# Patient Record
Sex: MALE | Race: WHITE | Employment: FULL TIME | URBAN - METROPOLITAN AREA
[De-identification: names, ages, dates, MRNs, and addresses within clinical notes are randomized per-mention and may not be internally consistent; named-entity substitution may affect disease eponyms.]

---

## 2017-05-06 ENCOUNTER — ANESTHESIA EVENT (OUTPATIENT)
Dept: SURGERY | Facility: CLINIC | Age: 57
End: 2017-05-06
Payer: COMMERCIAL

## 2017-05-06 ENCOUNTER — HOSPITAL ENCOUNTER (OUTPATIENT)
Facility: CLINIC | Age: 57
Setting detail: OBSERVATION
Discharge: HOME | End: 2017-05-07
Attending: EMERGENCY MEDICINE | Admitting: COLON & RECTAL SURGERY
Payer: COMMERCIAL

## 2017-05-06 ENCOUNTER — ANESTHESIA (OUTPATIENT)
Dept: SURGERY | Facility: CLINIC | Age: 57
End: 2017-05-06
Payer: COMMERCIAL

## 2017-05-06 DIAGNOSIS — K62.89 ANAL PAIN: Primary | ICD-10-CM

## 2017-05-06 DIAGNOSIS — K64.9 BLEEDING HEMORRHOID: ICD-10-CM

## 2017-05-06 LAB
BASOPHILS # BLD AUTO: 0 10E9/L (ref 0–0.2)
BASOPHILS NFR BLD AUTO: 0.4 %
DIFFERENTIAL METHOD BLD: ABNORMAL
EOSINOPHIL # BLD AUTO: 0.3 10E9/L (ref 0–0.7)
EOSINOPHIL NFR BLD AUTO: 4.4 %
ERYTHROCYTE [DISTWIDTH] IN BLOOD BY AUTOMATED COUNT: 19.9 % (ref 10–15)
HCT VFR BLD AUTO: 32.9 % (ref 40–53)
HGB BLD-MCNC: 9.9 G/DL (ref 13.3–17.7)
IMM GRANULOCYTES # BLD: 0 10E9/L (ref 0–0.4)
IMM GRANULOCYTES NFR BLD: 0.1 %
LYMPHOCYTES # BLD AUTO: 1.1 10E9/L (ref 0.8–5.3)
LYMPHOCYTES NFR BLD AUTO: 16.1 %
MCH RBC QN AUTO: 20 PG (ref 26.5–33)
MCHC RBC AUTO-ENTMCNC: 30.1 G/DL (ref 31.5–36.5)
MCV RBC AUTO: 67 FL (ref 78–100)
MONOCYTES # BLD AUTO: 0.6 10E9/L (ref 0–1.3)
MONOCYTES NFR BLD AUTO: 7.9 %
NEUTROPHILS # BLD AUTO: 5.1 10E9/L (ref 1.6–8.3)
NEUTROPHILS NFR BLD AUTO: 71.1 %
NRBC # BLD AUTO: 0 10*3/UL
NRBC BLD AUTO-RTO: 0 /100
PLATELET # BLD AUTO: 306 10E9/L (ref 150–450)
RBC # BLD AUTO: 4.94 10E12/L (ref 4.4–5.9)
WBC # BLD AUTO: 7.1 10E9/L (ref 4–11)

## 2017-05-06 PROCEDURE — 37000008 ZZH ANESTHESIA TECHNICAL FEE, 1ST 30 MIN: Performed by: COLON & RECTAL SURGERY

## 2017-05-06 PROCEDURE — 25000125 ZZHC RX 250: Performed by: NURSE ANESTHETIST, CERTIFIED REGISTERED

## 2017-05-06 PROCEDURE — 25000125 ZZHC RX 250: Performed by: ANESTHESIOLOGY

## 2017-05-06 PROCEDURE — G0378 HOSPITAL OBSERVATION PER HR: HCPCS

## 2017-05-06 PROCEDURE — 25800025 ZZH RX 258: Performed by: NURSE ANESTHETIST, CERTIFIED REGISTERED

## 2017-05-06 PROCEDURE — 27210995 ZZH RX 272: Performed by: COLON & RECTAL SURGERY

## 2017-05-06 PROCEDURE — 36000052 ZZH SURGERY LEVEL 2 EA 15 ADDTL MIN: Performed by: COLON & RECTAL SURGERY

## 2017-05-06 PROCEDURE — 71000012 ZZH RECOVERY PHASE 1 LEVEL 1 FIRST HR: Performed by: COLON & RECTAL SURGERY

## 2017-05-06 PROCEDURE — 25000128 H RX IP 250 OP 636: Performed by: COLON & RECTAL SURGERY

## 2017-05-06 PROCEDURE — 37000009 ZZH ANESTHESIA TECHNICAL FEE, EACH ADDTL 15 MIN: Performed by: COLON & RECTAL SURGERY

## 2017-05-06 PROCEDURE — 96374 THER/PROPH/DIAG INJ IV PUSH: CPT

## 2017-05-06 PROCEDURE — 25000128 H RX IP 250 OP 636: Performed by: NURSE ANESTHETIST, CERTIFIED REGISTERED

## 2017-05-06 PROCEDURE — 85025 COMPLETE CBC W/AUTO DIFF WBC: CPT | Performed by: COLON & RECTAL SURGERY

## 2017-05-06 PROCEDURE — 40000169 ZZH STATISTIC PRE-PROCEDURE ASSESSMENT I: Performed by: COLON & RECTAL SURGERY

## 2017-05-06 PROCEDURE — 25000128 H RX IP 250 OP 636: Performed by: ANESTHESIOLOGY

## 2017-05-06 PROCEDURE — 25000125 ZZHC RX 250: Performed by: COLON & RECTAL SURGERY

## 2017-05-06 PROCEDURE — 27210794 ZZH OR GENERAL SUPPLY STERILE: Performed by: COLON & RECTAL SURGERY

## 2017-05-06 PROCEDURE — 88304 TISSUE EXAM BY PATHOLOGIST: CPT | Mod: 26 | Performed by: COLON & RECTAL SURGERY

## 2017-05-06 PROCEDURE — 99285 EMERGENCY DEPT VISIT HI MDM: CPT | Mod: 25

## 2017-05-06 PROCEDURE — S0020 INJECTION, BUPIVICAINE HYDRO: HCPCS | Performed by: COLON & RECTAL SURGERY

## 2017-05-06 PROCEDURE — 88304 TISSUE EXAM BY PATHOLOGIST: CPT | Performed by: COLON & RECTAL SURGERY

## 2017-05-06 PROCEDURE — 36000050 ZZH SURGERY LEVEL 2 1ST 30 MIN: Performed by: COLON & RECTAL SURGERY

## 2017-05-06 RX ORDER — SODIUM CHLORIDE, SODIUM LACTATE, POTASSIUM CHLORIDE, CALCIUM CHLORIDE 600; 310; 30; 20 MG/100ML; MG/100ML; MG/100ML; MG/100ML
INJECTION, SOLUTION INTRAVENOUS CONTINUOUS PRN
Status: DISCONTINUED | OUTPATIENT
Start: 2017-05-06 | End: 2017-05-06

## 2017-05-06 RX ORDER — LORATADINE 10 MG/1
10 TABLET ORAL DAILY
Status: ON HOLD | COMMUNITY
End: 2017-05-06

## 2017-05-06 RX ORDER — MAGNESIUM HYDROXIDE 1200 MG/15ML
LIQUID ORAL PRN
Status: DISCONTINUED | OUTPATIENT
Start: 2017-05-06 | End: 2017-05-06 | Stop reason: HOSPADM

## 2017-05-06 RX ORDER — ONDANSETRON 4 MG/1
4 TABLET, ORALLY DISINTEGRATING ORAL EVERY 6 HOURS PRN
Status: DISCONTINUED | OUTPATIENT
Start: 2017-05-06 | End: 2017-05-07 | Stop reason: HOSPADM

## 2017-05-06 RX ORDER — ONDANSETRON 2 MG/ML
INJECTION INTRAMUSCULAR; INTRAVENOUS PRN
Status: DISCONTINUED | OUTPATIENT
Start: 2017-05-06 | End: 2017-05-06

## 2017-05-06 RX ORDER — KETOROLAC TROMETHAMINE 30 MG/ML
INJECTION, SOLUTION INTRAMUSCULAR; INTRAVENOUS PRN
Status: DISCONTINUED | OUTPATIENT
Start: 2017-05-06 | End: 2017-05-06

## 2017-05-06 RX ORDER — LIDOCAINE 40 MG/G
CREAM TOPICAL
Status: DISCONTINUED | OUTPATIENT
Start: 2017-05-06 | End: 2017-05-07 | Stop reason: HOSPADM

## 2017-05-06 RX ORDER — ONDANSETRON 4 MG/1
4 TABLET, ORALLY DISINTEGRATING ORAL EVERY 30 MIN PRN
Status: DISCONTINUED | OUTPATIENT
Start: 2017-05-06 | End: 2017-05-06 | Stop reason: HOSPADM

## 2017-05-06 RX ORDER — PROPOFOL 10 MG/ML
INJECTION, EMULSION INTRAVENOUS PRN
Status: DISCONTINUED | OUTPATIENT
Start: 2017-05-06 | End: 2017-05-06

## 2017-05-06 RX ORDER — NALOXONE HYDROCHLORIDE 0.4 MG/ML
.1-.4 INJECTION, SOLUTION INTRAMUSCULAR; INTRAVENOUS; SUBCUTANEOUS
Status: DISCONTINUED | OUTPATIENT
Start: 2017-05-06 | End: 2017-05-07 | Stop reason: HOSPADM

## 2017-05-06 RX ORDER — ONDANSETRON 2 MG/ML
4 INJECTION INTRAMUSCULAR; INTRAVENOUS EVERY 30 MIN PRN
Status: DISCONTINUED | OUTPATIENT
Start: 2017-05-06 | End: 2017-05-06 | Stop reason: HOSPADM

## 2017-05-06 RX ORDER — FENTANYL CITRATE 50 UG/ML
50 INJECTION, SOLUTION INTRAMUSCULAR; INTRAVENOUS ONCE
Status: COMPLETED | OUTPATIENT
Start: 2017-05-06 | End: 2017-05-06

## 2017-05-06 RX ORDER — FENTANYL CITRATE 0.05 MG/ML
25-50 INJECTION, SOLUTION INTRAMUSCULAR; INTRAVENOUS
Status: DISCONTINUED | OUTPATIENT
Start: 2017-05-06 | End: 2017-05-06 | Stop reason: HOSPADM

## 2017-05-06 RX ORDER — OXYCODONE AND ACETAMINOPHEN 5; 325 MG/1; MG/1
1-2 TABLET ORAL EVERY 6 HOURS PRN
Qty: 50 TABLET | Refills: 0 | Status: ON HOLD | OUTPATIENT
Start: 2017-05-06 | End: 2017-05-12

## 2017-05-06 RX ORDER — ONDANSETRON 2 MG/ML
4 INJECTION INTRAMUSCULAR; INTRAVENOUS EVERY 6 HOURS PRN
Status: DISCONTINUED | OUTPATIENT
Start: 2017-05-06 | End: 2017-05-07 | Stop reason: HOSPADM

## 2017-05-06 RX ORDER — DIAZEPAM 5 MG
5 TABLET ORAL EVERY 8 HOURS PRN
Qty: 20 TABLET | Refills: 0 | Status: SHIPPED | OUTPATIENT
Start: 2017-05-06

## 2017-05-06 RX ORDER — OXYCODONE AND ACETAMINOPHEN 5; 325 MG/1; MG/1
1-2 TABLET ORAL EVERY 6 HOURS PRN
Status: DISCONTINUED | OUTPATIENT
Start: 2017-05-06 | End: 2017-05-07 | Stop reason: HOSPADM

## 2017-05-06 RX ORDER — IBUPROFEN 600 MG/1
600 TABLET, FILM COATED ORAL
Status: DISCONTINUED | OUTPATIENT
Start: 2017-05-06 | End: 2017-05-06

## 2017-05-06 RX ORDER — CETIRIZINE HYDROCHLORIDE 10 MG/1
TABLET ORAL DAILY
COMMUNITY

## 2017-05-06 RX ORDER — IBUPROFEN 600 MG/1
600 TABLET, FILM COATED ORAL EVERY 6 HOURS PRN
Status: DISCONTINUED | OUTPATIENT
Start: 2017-05-06 | End: 2017-05-07 | Stop reason: HOSPADM

## 2017-05-06 RX ORDER — DESVENLAFAXINE 50 MG/1
50 TABLET, FILM COATED, EXTENDED RELEASE ORAL DAILY
COMMUNITY

## 2017-05-06 RX ORDER — PROPOFOL 10 MG/ML
INJECTION, EMULSION INTRAVENOUS CONTINUOUS PRN
Status: DISCONTINUED | OUTPATIENT
Start: 2017-05-06 | End: 2017-05-06

## 2017-05-06 RX ORDER — MEPERIDINE HYDROCHLORIDE 25 MG/ML
12.5 INJECTION INTRAMUSCULAR; INTRAVENOUS; SUBCUTANEOUS ONCE
Status: COMPLETED | OUTPATIENT
Start: 2017-05-06 | End: 2017-05-06

## 2017-05-06 RX ORDER — ALBUTEROL SULFATE 90 UG/1
1-2 AEROSOL, METERED RESPIRATORY (INHALATION) EVERY 4 HOURS PRN
COMMUNITY

## 2017-05-06 RX ORDER — ACETAMINOPHEN 10 MG/ML
1000 INJECTION, SOLUTION INTRAVENOUS ONCE
Status: COMPLETED | OUTPATIENT
Start: 2017-05-06 | End: 2017-05-06

## 2017-05-06 RX ORDER — SODIUM CHLORIDE, SODIUM LACTATE, POTASSIUM CHLORIDE, CALCIUM CHLORIDE 600; 310; 30; 20 MG/100ML; MG/100ML; MG/100ML; MG/100ML
INJECTION, SOLUTION INTRAVENOUS CONTINUOUS
Status: DISCONTINUED | OUTPATIENT
Start: 2017-05-06 | End: 2017-05-06 | Stop reason: HOSPADM

## 2017-05-06 RX ORDER — DEXAMETHASONE SODIUM PHOSPHATE 4 MG/ML
INJECTION, SOLUTION INTRA-ARTICULAR; INTRALESIONAL; INTRAMUSCULAR; INTRAVENOUS; SOFT TISSUE PRN
Status: DISCONTINUED | OUTPATIENT
Start: 2017-05-06 | End: 2017-05-06

## 2017-05-06 RX ORDER — OXYCODONE AND ACETAMINOPHEN 5; 325 MG/1; MG/1
1-2 TABLET ORAL
Status: DISCONTINUED | OUTPATIENT
Start: 2017-05-06 | End: 2017-05-06

## 2017-05-06 RX ORDER — LIDOCAINE HYDROCHLORIDE 20 MG/ML
INJECTION, SOLUTION INFILTRATION; PERINEURAL PRN
Status: DISCONTINUED | OUTPATIENT
Start: 2017-05-06 | End: 2017-05-06

## 2017-05-06 RX ORDER — FENTANYL CITRATE 50 UG/ML
25-50 INJECTION, SOLUTION INTRAMUSCULAR; INTRAVENOUS
Status: DISCONTINUED | OUTPATIENT
Start: 2017-05-06 | End: 2017-05-06 | Stop reason: HOSPADM

## 2017-05-06 RX ORDER — HYDROMORPHONE HYDROCHLORIDE 1 MG/ML
.3-.5 INJECTION, SOLUTION INTRAMUSCULAR; INTRAVENOUS; SUBCUTANEOUS
Status: DISCONTINUED | OUTPATIENT
Start: 2017-05-06 | End: 2017-05-07 | Stop reason: HOSPADM

## 2017-05-06 RX ADMIN — FENTANYL CITRATE 25 MCG: 50 INJECTION, SOLUTION INTRAMUSCULAR; INTRAVENOUS at 18:16

## 2017-05-06 RX ADMIN — ROCURONIUM BROMIDE 10 MG: 10 INJECTION INTRAVENOUS at 17:45

## 2017-05-06 RX ADMIN — HYDROMORPHONE HYDROCHLORIDE 0.5 MG: 1 INJECTION, SOLUTION INTRAMUSCULAR; INTRAVENOUS; SUBCUTANEOUS at 15:01

## 2017-05-06 RX ADMIN — FENTANYL CITRATE 50 MCG: 50 INJECTION INTRAMUSCULAR; INTRAVENOUS at 19:18

## 2017-05-06 RX ADMIN — FENTANYL CITRATE 50 MCG: 50 INJECTION INTRAMUSCULAR; INTRAVENOUS at 19:00

## 2017-05-06 RX ADMIN — FENTANYL CITRATE 50 MCG: 50 INJECTION, SOLUTION INTRAMUSCULAR; INTRAVENOUS at 17:56

## 2017-05-06 RX ADMIN — SODIUM CHLORIDE, POTASSIUM CHLORIDE, SODIUM LACTATE AND CALCIUM CHLORIDE: 600; 310; 30; 20 INJECTION, SOLUTION INTRAVENOUS at 18:44

## 2017-05-06 RX ADMIN — HYDROMORPHONE HYDROCHLORIDE 0.5 MG: 1 INJECTION, SOLUTION INTRAMUSCULAR; INTRAVENOUS; SUBCUTANEOUS at 19:36

## 2017-05-06 RX ADMIN — HYDROMORPHONE HYDROCHLORIDE 0.5 MG: 1 INJECTION, SOLUTION INTRAMUSCULAR; INTRAVENOUS; SUBCUTANEOUS at 19:18

## 2017-05-06 RX ADMIN — HYDROMORPHONE HYDROCHLORIDE 0.5 MG: 1 INJECTION, SOLUTION INTRAMUSCULAR; INTRAVENOUS; SUBCUTANEOUS at 20:13

## 2017-05-06 RX ADMIN — PROPOFOL 200 MG: 10 INJECTION, EMULSION INTRAVENOUS at 17:45

## 2017-05-06 RX ADMIN — HYDROMORPHONE HYDROCHLORIDE 0.5 MG: 1 INJECTION, SOLUTION INTRAMUSCULAR; INTRAVENOUS; SUBCUTANEOUS at 23:13

## 2017-05-06 RX ADMIN — FENTANYL CITRATE 50 MCG: 50 INJECTION INTRAMUSCULAR; INTRAVENOUS at 19:25

## 2017-05-06 RX ADMIN — SODIUM CHLORIDE, POTASSIUM CHLORIDE, SODIUM LACTATE AND CALCIUM CHLORIDE: 600; 310; 30; 20 INJECTION, SOLUTION INTRAVENOUS at 17:08

## 2017-05-06 RX ADMIN — PROPOFOL 200 MCG/KG/MIN: 10 INJECTION, EMULSION INTRAVENOUS at 17:46

## 2017-05-06 RX ADMIN — MEPERIDINE HYDROCHLORIDE 12.5 MG: 25 INJECTION, SOLUTION INTRAMUSCULAR; INTRAVENOUS; SUBCUTANEOUS at 19:13

## 2017-05-06 RX ADMIN — SUCCINYLCHOLINE CHLORIDE 200 MG: 20 INJECTION, SOLUTION INTRAMUSCULAR; INTRAVENOUS at 17:45

## 2017-05-06 RX ADMIN — FENTANYL CITRATE 50 MCG: 50 INJECTION INTRAMUSCULAR; INTRAVENOUS at 19:07

## 2017-05-06 RX ADMIN — ACETAMINOPHEN 1000 MG: 10 INJECTION, SOLUTION INTRAVENOUS at 19:24

## 2017-05-06 RX ADMIN — HYDROMORPHONE HYDROCHLORIDE 0.5 MG: 1 INJECTION, SOLUTION INTRAMUSCULAR; INTRAVENOUS; SUBCUTANEOUS at 19:02

## 2017-05-06 RX ADMIN — ONDANSETRON 4 MG: 2 INJECTION INTRAMUSCULAR; INTRAVENOUS at 17:59

## 2017-05-06 RX ADMIN — ONDANSETRON 4 MG: 2 SOLUTION INTRAMUSCULAR; INTRAVENOUS at 20:22

## 2017-05-06 RX ADMIN — FENTANYL CITRATE 50 MCG: 50 INJECTION, SOLUTION INTRAMUSCULAR; INTRAVENOUS at 16:46

## 2017-05-06 RX ADMIN — FENTANYL CITRATE 50 MCG: 50 INJECTION, SOLUTION INTRAMUSCULAR; INTRAVENOUS at 17:42

## 2017-05-06 RX ADMIN — FENTANYL CITRATE 25 MCG: 50 INJECTION, SOLUTION INTRAMUSCULAR; INTRAVENOUS at 18:22

## 2017-05-06 RX ADMIN — KETOROLAC TROMETHAMINE 30 MG: 30 INJECTION, SOLUTION INTRAMUSCULAR at 18:33

## 2017-05-06 RX ADMIN — PROPOFOL 40 MG: 10 INJECTION, EMULSION INTRAVENOUS at 18:30

## 2017-05-06 RX ADMIN — MIDAZOLAM HYDROCHLORIDE 2 MG: 1 INJECTION, SOLUTION INTRAMUSCULAR; INTRAVENOUS at 17:39

## 2017-05-06 RX ADMIN — DEXAMETHASONE SODIUM PHOSPHATE 4 MG: 4 INJECTION, SOLUTION INTRA-ARTICULAR; INTRALESIONAL; INTRAMUSCULAR; INTRAVENOUS; SOFT TISSUE at 17:52

## 2017-05-06 RX ADMIN — HYDROMORPHONE HYDROCHLORIDE 0.5 MG: 1 INJECTION, SOLUTION INTRAMUSCULAR; INTRAVENOUS; SUBCUTANEOUS at 19:41

## 2017-05-06 RX ADMIN — LIDOCAINE HYDROCHLORIDE 60 MG: 20 INJECTION, SOLUTION INFILTRATION; PERINEURAL at 17:45

## 2017-05-06 ASSESSMENT — ENCOUNTER SYMPTOMS
CHILLS: 0
SHORTNESS OF BREATH: 0
RECTAL PAIN: 1
ANAL BLEEDING: 1
ABDOMINAL PAIN: 0
FEVER: 0
CONSTIPATION: 0

## 2017-05-06 ASSESSMENT — PAIN DESCRIPTION - DESCRIPTORS: DESCRIPTORS: DISCOMFORT

## 2017-05-06 NOTE — IP AVS SNAPSHOT
[facility] Observation Unit    [address] [place] [id number]    Phone:  [phone]                                       After Visit Summary   5/6/2017    Aman Tarango    MRN: [id number]           After Visit Summary Signature Page     I have received my discharge instructions, and my questions have been answered. I have discussed any challenges I see with this plan with the nurse or doctor.    ..........................................................................................................................................  Patient/Patient Representative Signature      ..........................................................................................................................................  Patient Representative Print Name and Relationship to Patient    ..................................................               ................................................  Date                                            Time    ..........................................................................................................................................  Reviewed by Signature/Title    ...................................................              ..............................................  Date                                                            Time

## 2017-05-06 NOTE — DISCHARGE INSTRUCTIONS
**If you have questions or concerns about your procedure,  call Dr. Ball at [phone]**  Discharge Instructions Following Anorectal Surgery  [facility]  [phone]  Medication:     You may be prescribed a narcotic pain relievers such as: Percocet and Valium.  You should reduce your use of these medications as your pain improves.  You may be prescribed an anal ointment (such as Americain, Tronothane, or Xylocaine). Apply the ointment to the anal area with your finger, then cover the area with cotton or gauze.  Bulk forming stool softeners (Konsyl-DR, Metamucil, or Citrucel) are to be taken in a glass of water once in the morning with increased water intake throughout the day.   Bowel function:   Bowel movements can be painful. It is important to have regular bowel movements at least every other day and to keep your stool soft. Constipation and/or diarrhea can make the pain worse and must be avoided.   Constipation:  You should have a bowel movement at least every other day.  If two days pass without one, take one tablespoon of milk of magnesia; if there is no result, repeat this dose in six hours. You may also use an over-the-counter phosphate enema or tap water enema.   Diarrhea:  Diarrhea, usually caused by the overuse of laxatives, is also a concern. If you have more than three watery bowel movements during a 24 hour period, stop taking milk of magnesia or laxatives but continue taking the bulk forming agents.  If diarrhea persists, call your doctor.   Bathing:  After bowel movements, use a wet washcloth, toilet paper or cotton to clean yourself.  If possible take a sitz bath or tub bath immediately. Baths should last between 10-15 minutes with the water as warm as you can comfortably tolerate. Try to take at least three sitz baths (or showers with hand-held sprayer) every day.   Discharge/Infection:  Bloody discharge after bowel movements is normal and may last 2 to 4 weeks after  your surgery. However, if you have bleeding between bowel movements that doesn't stop, call the office.  Urination:  If you have trouble urinating, do so while sitting in a tub of water, or run the water faucet while sitting on the toilet. If you are unable to urinate 6-8 hours after surgery, call the office.  Diet:  A high fiber diet, including at least four servings of fruits or vegetables daily, will help to keep your bowel movements regular and soft. It is important to drink six to eight glasses of water or juice everyday when using fiber products.   Activity:    Activity as tolerated. After some surgeries, you may be told not to perform any lifting (more than 10 pounds) for several weeks after surgery.    Call the office if you have:  Fever greater than 101 F  Chills   Foul-smelling drainage   Nausea and vomiting   Diarrhea - greater than 3 water stools in 24 hours   Constipation - no bowel movement for 3 days   Severe bleeding that does not stop soon after a bowel movement   Problems with the incision, including increased pain, swelling, redness, or drainage.  Difficulty urinating                  Hemorrhoids    Hemorrhoids are swollen and inflamed veins inside the rectum and near the anus. The rectum is the last several inches of the colon. The anus is the passage between the rectum and the outside of the body.  Causes   The veins can become swollen due to increased pressure in them. This is most often caused by:    Chronic constipation or diarrhea    Straining when having a bowel movement    Sitting too long on the toilet    A low-fiber diet    Pregnancy  Symptoms     Bleeding from the rectum (this may be noticeable after bowel movements)    Lump near the anus    Itching around the anus    Pain around the anus  There are different types of hemorrhoids. Depending on the type you have and the severity, you may be able to treat yourself at home. In some cases, a procedure may be the best treatment option. Your  healthcare provider can tell you more about this, if needed.  Home care  General care    To get relief from pain or itching, try:    Topical products. Your healthcare provider may prescribe or recommend creams, ointments, or pads that can be applied to the hemorrhoid. Use these exactly as directed.    Medicines. Your healthcare provider may recommend stool softeners, suppositories, or laxatives to help manage constipation. Use these exactly as directed.    Sitz baths. A sitz bath involves sitting in a few inches of warm bath water. Be careful not to make the water so hot that you burn yourself--test it before sitting in it. Soak for about 10 to 15 minutes a few times a day. This may help relieve pain.  Tips to help prevent hemorrhoids    Eat more fiber. Fiber adds bulk to stool and absorbs water as it moves through your colon. This makes stool softer and easier to pass.    Increase the fiber in your diet with more fiber-rich foods. These include fresh fruit, vegetables, and whole grains.    Take a fiber supplement or bulking agent, if advised to by your provider. These include products such as psyllium or methylcellulose.    Drink plenty of water, if directed to by your provider. This can help keep stool soft.    Be more active. Frequent exercise aids digestion and helps prevent constipation. It may also help make bowel movements more regular.    Don t strain during bowel movements. This can make hemorrhoids more likely. Also, don t sit on the toilet for long periods of time.  Follow-up care  Follow up with your healthcare provider, or as advised. If a culture or imaging tests were done, you will be notified of the results when they are ready. This may take a few days or longer.  When to seek medical advice  Call your healthcare provider right away if any of these occur:    Increased bleeding from the rectum    Increased pain around the rectum or anus    Weakness or dizziness   Call 911   Call 911 or return to the  emergency department right away if any of these occur:    Trouble breathing or swallowing    Fainting or loss of consciousness    Unusually fast heart rate    Vomiting blood    Large amounts of blood in stool      6026-8092 [employer] [address] [place] [address], [place] [address]. All rights reserved. This information is not intended as a substitute for professional medical care. Always follow your healthcare professional's instructions.          Treating Hemorrhoids: Removal  If your symptoms persist, your doctor may recommend removing the hemorrhoid. This can be done in your doctor s office or at a surgical center. In most cases, no special preparation is needed. Keep in mind that your treatment may differ depending on your symptoms and the location of the hemorrhoid.  Internal Hemorrhoids  You ll be asked to lie or kneel on a table. Your doctor then inserts an anoscope to view the anal canal. To treat the hemorrhoid, your doctor will use one of the methods listed below. Because internal hemorrhoids do not have nerves that sense pain, you won t have too much discomfort. You can often return to your normal routine the same day. If you have many hemorrhoids, you may need repeated treatments.    Banding  The banding method is done by placing tight elastic bands around the base of the hemorrhoid. This cuts off blood supply to the hemorrhoid, causing it to fall off. This usually takes about a week. The area then heals within a few days.       Infrared Coagulation  This procedure is done using a small probe that exposes the hemorrhoid to short bursts of infrared light. This seals off the blood vessel, causing it to shrink. Slight bleeding may occur for a few days. The area usually heals within a week or two.       Sclerotherapy  Sclerotherapy is done by injecting a chemical into the tissue around the hemorrhoid. The chemical causes the hemorrhoid to shrink within a few days. Bleeding usually stops in about  24 hours.  Thrombosed External Hemorrhoids  Thrombosed external hemorrhoids are often very painful. That s because the swollen hemorrhoid stretches the sensitive skin around it. To relieve the pain, your doctor may remove the blood clot. This takes just a few minutes. You may need to rest for a few days before returning to work.    Numbing the Hemorrhoid: You ll be asked to lie or kneel on a table. The hemorrhoid is then injected with a local anesthetic. This may cause some discomfort for a moment. But within a short time your doctor will be able to remove the hemorrhoid without causing pain.    Removing the Hemorrhoid: A small incision is made to remove the blood clot. The hemorrhoid may also be removed. The skin is then either closed with sutures or left open to heal on its own. The area around the incision will likely be sore for a few days. But your pain should improve soon after the procedure.  Risks and Complications  The possible risks and complications include:    Infection    Bleeding    Trouble urinating (Doesn't occur with thrombosed external hemorrhoids)    Narrowing of the anal canal (very rare, doesn't occur with thrombosed external hemorrhoids)  When to Call Your Doctor  After your procedure, call your doctor if you have:    Increasing pain    Fever or chills    Persistent bleeding    Trouble urinating        6940-2411 [employer]. [address] [place] [address], [place] [address]. All rights reserved. This information is not intended as a substitute for professional medical care. Always follow your healthcare professional's instructions.

## 2017-05-06 NOTE — ED NOTES
[facility]  ED Nurse Handoff Report    ED Chief complaint: Hemorrhoids (pt has recent banding of hemorrhoid)      ED Diagnosis:   Final diagnoses:   Bleeding hemorrhoid       Code Status: Full Code    Allergies: No Known Allergies    Activity level - Baseline/Home:  Independent    Activity Level - Current:   Independent     Needed?: No    Isolation: No  Infection: Not Applicable    Bariatric?: No    Vital Signs:   Vitals:    05/06/17 1255 05/06/17 1345 05/06/17 1502 05/06/17 1503   BP: (!) 144/94 110/70 140/89    Pulse: 111 91     Resp: 16 16     Temp: 97.7  F (36.5  C)      TempSrc: Oral      SpO2: 99% 95%  100%   Weight: 90.7 kg (200 lb)          Cardiac Rhythm: ,        Pain level: 0-10 Pain Scale: 8    Is this patient confused?: No    Patient Report: Initial Complaint: Patient came in with hemorrhoid pain  Focused Assessment: Patient came in today after having recent banding surgery of hemorrhoids. He reports pain has been steadily increasing over past several days and today he can not tolerate it anymore. Surgical consult was done and patient will be taken to surgery later today.   Tests Performed: CBC  Abnormal Results: pending  Treatments provided: ice to rectal area, IV placed right AC, Hydromorphone 0.5mg given IV    Family Comments: SO at bedside    OBS brochure/video discussed/provided to patient: Yes    ED Medications:   Medications   lidocaine 1 % 1 mL (not administered)   lidocaine (LMX4) cream (not administered)   sodium chloride (PF) 0.9% PF flush 3 mL (not administered)   sodium chloride (PF) 0.9% PF flush 3 mL (not administered)   naloxone (NARCAN) injection 0.1-0.4 mg (not administered)   ondansetron (ZOFRAN-ODT) ODT tab 4 mg (not administered)     Or   ondansetron (ZOFRAN) injection 4 mg (not administered)   HYDROmorphone (PF) (DILAUDID) injection 0.3-0.5 mg (0.5 mg Intravenous Given 5/6/17 1501)       Drips infusing?:  No      ED NURSE PHONE NUMBER: *[phone]

## 2017-05-06 NOTE — ANESTHESIA PREPROCEDURE EVALUATION
Anesthesia Evaluation     . Pt has had prior anesthetic.     History of anesthetic complications   - PONV        ROS/MED HX    ENT/Pulmonary:     (+)Intermittent asthma Treatment: Inhaler prn,  , . .   (-) sleep apnea   Neurologic:       Cardiovascular:  - neg cardiovascular ROS       METS/Exercise Tolerance:     Hematologic:         Musculoskeletal:         GI/Hepatic:     (+) GERD Asymptomatic on medication,       Renal/Genitourinary:         Endo:     (+) thyroid problem hypothyroidism, .      Psychiatric:         Infectious Disease:         Malignancy:         Other:                     Physical Exam  Normal systems: cardiovascular and pulmonary    Airway   Mallampati: I  TM distance: >3 FB  Neck ROM: full    Dental   Comment: native    Cardiovascular       Pulmonary                     Anesthesia Plan      History & Physical Review  History and physical reviewed and following examination; no interval change.    ASA Status:  2 .    NPO Status:  > 6 hours    Plan for General and ETT with Propofol induction. Maintenance will be TIVA.    PONV prophylaxis:  Ondansetron (or other 5HT-3) and Dexamethasone or Solumedrol       Postoperative Care      Consents  Anesthetic plan, risks, benefits and alternatives discussed with:  Patient..                          .

## 2017-05-06 NOTE — IP AVS SNAPSHOT
MRN:[id number]                      After Visit Summary   5/6/2017    Aman Tarango    MRN: [id number]           Thank you!     Thank you for choosing [facility] for your care. Our goal is always to provide you with excellent care. Hearing back from our patients is one way we can continue to improve our services. Please take a few minutes to complete the written survey that you may receive in the mail after you visit with us. Thank you!        Patient Information     Date Of Birth          1960        About your hospital stay     You were admitted on:  May 6, 2017 [facility]    You were discharged on:  May 7, 2017        Reason for your hospital stay       Anal pain status post surgery for hemorrhoid removal                  Who to Call     For medical emergencies, please call 911.  For non-urgent questions about your medical care, please call your primary care provider or clinic, [phone]  For questions related to your surgery, please call your surgery clinic        Attending Provider     Provider Specialty    Sonny Wolf MD Emergency Medicine    [facility], Fannie Pagan MD Colon and Rectal Surgery       Primary Care Provider Office Phone # Fax #    Ash Jeremias Boucher [phone]       [address]  [facility] [address]        After Care Instructions     Activity       Your activity upon discharge: no lifting >20 lbs or strenuous exercise for 4 weeks            Diet       Follow this diet upon discharge: Regular.  Drink plenty of fluids to keep stools soft            Diet Instructions       Resume pre-procedure diet            Discharge Instructions       -Make an appointment to follow up with Dr. Martinez in 3-6 weeks (or as previously scheduled). Call [phone] to schedule this appointment.   -Call the office if you develop pain that is not controlled with pain medication, bleeding  that does stop, fever, or constipation.   -Dress the area with dry gauze to prevent spotting on clothes.   -Do warm baths several times daily.   -Take Metamucil or Miralax 1 tablespoon daily in 8 ounces of fluid if necessary to prevent constipation.   -Resume activities as tolerated.   -No driving while taking narcotics.  -Tylenol and ibuprofen can be taken for discomfort / pain.            Discharge Instructions       -Make an appointment to follow up with Dr. Martinez in 3-6 weeks (or as previously scheduled). Call [phone] to schedule this appointment.   - If mckeon catheter is placed in bladder then remove on Tuesday  -Call the office if you develop pain that is not controlled with pain medication, bleeding that does stop, fever, or constipation.   -Dress the area with dry gauze to prevent spotting on clothes.   -Do warm baths several times daily.   -Take Metamucil or Miralax 1 tablespoon daily in 8 ounces of fluid if necessary to prevent constipation.   -Resume activities as tolerated.   -No driving while taking narcotics.  -Tylenol and ibuprofen can be taken for discomfort / pain.            Weight bearing status - As tolerated                 Follow-up Appointments     Follow-up and recommended labs and tests        Follow up with Dr. Martinez as scheduled previously. [phone]                  Further instructions from your care team         **If you have questions or concerns about your procedure,  call Dr. Ball at [phone]**  Discharge Instructions Following Anorectal Surgery  [facility]  [phone]  Medication:     You may be prescribed a narcotic pain relievers such as: Percocet and Valium.  You should reduce your use of these medications as your pain improves.  You may be prescribed an anal ointment (such as Americain, Tronothane, or Xylocaine). Apply the ointment to the anal area with your finger, then cover the area with cotton or gauze.  Bulk forming stool softeners  (Konsyl-DR, Metamucil, or Citrucel) are to be taken in a glass of water once in the morning with increased water intake throughout the day.   Bowel function:   Bowel movements can be painful. It is important to have regular bowel movements at least every other day and to keep your stool soft. Constipation and/or diarrhea can make the pain worse and must be avoided.   Constipation:  You should have a bowel movement at least every other day.  If two days pass without one, take one tablespoon of milk of magnesia; if there is no result, repeat this dose in six hours. You may also use an over-the-counter phosphate enema or tap water enema.   Diarrhea:  Diarrhea, usually caused by the overuse of laxatives, is also a concern. If you have more than three watery bowel movements during a 24 hour period, stop taking milk of magnesia or laxatives but continue taking the bulk forming agents.  If diarrhea persists, call your doctor.   Bathing:  After bowel movements, use a wet washcloth, toilet paper or cotton to clean yourself.  If possible take a sitz bath or tub bath immediately. Baths should last between 10-15 minutes with the water as warm as you can comfortably tolerate. Try to take at least three sitz baths (or showers with hand-held sprayer) every day.   Discharge/Infection:  Bloody discharge after bowel movements is normal and may last 2 to 4 weeks after your surgery. However, if you have bleeding between bowel movements that doesn't stop, call the office.  Urination:  If you have trouble urinating, do so while sitting in a tub of water, or run the water faucet while sitting on the toilet. If you are unable to urinate 6-8 hours after surgery, call the office.  Diet:  A high fiber diet, including at least four servings of fruits or vegetables daily, will help to keep your bowel movements regular and soft. It is important to drink six to eight glasses of water or juice everyday when using fiber products.   Activity:     Activity as tolerated. After some surgeries, you may be told not to perform any lifting (more than 10 pounds) for several weeks after surgery.    Call the office if you have:  Fever greater than 101 F  Chills   Foul-smelling drainage   Nausea and vomiting   Diarrhea - greater than 3 water stools in 24 hours   Constipation - no bowel movement for 3 days   Severe bleeding that does not stop soon after a bowel movement   Problems with the incision, including increased pain, swelling, redness, or drainage.  Difficulty urinating                  Hemorrhoids    Hemorrhoids are swollen and inflamed veins inside the rectum and near the anus. The rectum is the last several inches of the colon. The anus is the passage between the rectum and the outside of the body.  Causes   The veins can become swollen due to increased pressure in them. This is most often caused by:    Chronic constipation or diarrhea    Straining when having a bowel movement    Sitting too long on the toilet    A low-fiber diet    Pregnancy  Symptoms     Bleeding from the rectum (this may be noticeable after bowel movements)    Lump near the anus    Itching around the anus    Pain around the anus  There are different types of hemorrhoids. Depending on the type you have and the severity, you may be able to treat yourself at home. In some cases, a procedure may be the best treatment option. Your healthcare provider can tell you more about this, if needed.  Home care  General care    To get relief from pain or itching, try:    Topical products. Your healthcare provider may prescribe or recommend creams, ointments, or pads that can be applied to the hemorrhoid. Use these exactly as directed.    Medicines. Your healthcare provider may recommend stool softeners, suppositories, or laxatives to help manage constipation. Use these exactly as directed.    Sitz baths. A sitz bath involves sitting in a few inches of warm bath water. Be careful not to make the water so  hot that you burn yourself--test it before sitting in it. Soak for about 10 to 15 minutes a few times a day. This may help relieve pain.  Tips to help prevent hemorrhoids    Eat more fiber. Fiber adds bulk to stool and absorbs water as it moves through your colon. This makes stool softer and easier to pass.    Increase the fiber in your diet with more fiber-rich foods. These include fresh fruit, vegetables, and whole grains.    Take a fiber supplement or bulking agent, if advised to by your provider. These include products such as psyllium or methylcellulose.    Drink plenty of water, if directed to by your provider. This can help keep stool soft.    Be more active. Frequent exercise aids digestion and helps prevent constipation. It may also help make bowel movements more regular.    Don t strain during bowel movements. This can make hemorrhoids more likely. Also, don t sit on the toilet for long periods of time.  Follow-up care  Follow up with your healthcare provider, or as advised. If a culture or imaging tests were done, you will be notified of the results when they are ready. This may take a few days or longer.  When to seek medical advice  Call your healthcare provider right away if any of these occur:    Increased bleeding from the rectum    Increased pain around the rectum or anus    Weakness or dizziness   Call 911   Call 911 or return to the emergency department right away if any of these occur:    Trouble breathing or swallowing    Fainting or loss of consciousness    Unusually fast heart rate    Vomiting blood    Large amounts of blood in stool      7521-4967 [employer]. [address] [place] [address], [place] [id number]. All rights reserved. This information is not intended as a substitute for professional medical care. Always follow your healthcare professional's instructions.          Treating Hemorrhoids: Removal  If your symptoms persist, your doctor may recommend removing the hemorrhoid. This  can be done in your doctor s office or at a surgical center. In most cases, no special preparation is needed. Keep in mind that your treatment may differ depending on your symptoms and the location of the hemorrhoid.  Internal Hemorrhoids  You ll be asked to lie or kneel on a table. Your doctor then inserts an anoscope to view the anal canal. To treat the hemorrhoid, your doctor will use one of the methods listed below. Because internal hemorrhoids do not have nerves that sense pain, you won t have too much discomfort. You can often return to your normal routine the same day. If you have many hemorrhoids, you may need repeated treatments.    Banding  The banding method is done by placing tight elastic bands around the base of the hemorrhoid. This cuts off blood supply to the hemorrhoid, causing it to fall off. This usually takes about a week. The area then heals within a few days.       Infrared Coagulation  This procedure is done using a small probe that exposes the hemorrhoid to short bursts of infrared light. This seals off the blood vessel, causing it to shrink. Slight bleeding may occur for a few days. The area usually heals within a week or two.       Sclerotherapy  Sclerotherapy is done by injecting a chemical into the tissue around the hemorrhoid. The chemical causes the hemorrhoid to shrink within a few days. Bleeding usually stops in about 24 hours.  Thrombosed External Hemorrhoids  Thrombosed external hemorrhoids are often very painful. That s because the swollen hemorrhoid stretches the sensitive skin around it. To relieve the pain, your doctor may remove the blood clot. This takes just a few minutes. You may need to rest for a few days before returning to work.    Numbing the Hemorrhoid: You ll be asked to lie or kneel on a table. The hemorrhoid is then injected with a local anesthetic. This may cause some discomfort for a moment. But within a short time your doctor will be able to remove the hemorrhoid  "without causing pain.    Removing the Hemorrhoid: A small incision is made to remove the blood clot. The hemorrhoid may also be removed. The skin is then either closed with sutures or left open to heal on its own. The area around the incision will likely be sore for a few days. But your pain should improve soon after the procedure.  Risks and Complications  The possible risks and complications include:    Infection    Bleeding    Trouble urinating (Doesn't occur with thrombosed external hemorrhoids)    Narrowing of the anal canal (very rare, doesn't occur with thrombosed external hemorrhoids)  When to Call Your Doctor  After your procedure, call your doctor if you have:    Increasing pain    Fever or chills    Persistent bleeding    Trouble urinating        8788-4505 [employer]. [address] [place] [address]. All rights reserved. This information is not intended as a substitute for professional medical care. Always follow your healthcare professional's instructions.          Pending Results     No orders found for last 3 day(s).            Admission Information     Date & Time Provider Department Dept. Phone    5/6/2017 Fannie Ball MD [facility] [phone]      Your Vitals Were     Blood Pressure Pulse Temperature Respirations Weight Pulse Oximetry    119/70 (BP Location: Left arm) 97 97.4  F (36.3  C) (Oral) 16 90.7 kg (200 lb) 97%      MyChart Information     [employer] lets you send messages to your doctor, view your test results, renew your prescriptions, schedule appointments and more. To sign up, go to [email or web address] . Click on \"Log in\" on the left side of the screen, which will take you to the Welcome page. Then click on \"Sign up Now\" on the right side of the page.     You will be asked to enter the access code listed below, as well as some personal information. Please follow the directions to create your username and password.     Your access code " is: [email or web address]  Expires: 2017  6:49 PM     Your access code will  in 90 days. If you need help or a new code, please call your [facility] clinic or [phone].        Care EveryWhere ID     This is your Care EveryWhere ID. This could be used by other organizations to access your [facility] medical records  [id number]           Review of your medicines      START taking        Dose / Directions    diazepam 5 MG tablet   Commonly known as:  VALIUM        Dose:  5 mg   Take 1 tablet (5 mg) by mouth every 8 hours as needed for muscle spasms or pain (as needed for rectal/anal spasm)   Quantity:  20 tablet   Refills:  0       oxyCODONE-acetaminophen 5-325 MG per tablet   Commonly known as:  PERCOCET        Dose:  1-2 tablet   Take 1-2 tablets by mouth every 6 hours as needed for pain (moderate to severe)   Quantity:  50 tablet   Refills:  0         CONTINUE these medicines which may have CHANGED, or have new prescriptions. If we are uncertain of the size of tablets/capsules you have at home, strength may be listed as something that might have changed.        Dose / Directions    SYNTHROID PO   This may have changed:  Another medication with the same name was removed. Continue taking this medication, and follow the directions you see here.        Dose:  150 mcg   Take 150 mcg by mouth daily   Refills:  0         CONTINUE these medicines which have NOT CHANGED        Dose / Directions    albuterol 108 (90 BASE) MCG/ACT Inhaler   Commonly known as:  PROAIR HFA/PROVENTIL HFA/VENTOLIN HFA        Dose:  1-2 puff   Inhale 1-2 puffs into the lungs every 4 hours as needed for shortness of breath / dyspnea or wheezing   Refills:  0       cetirizine 10 MG tablet   Commonly known as:  zyrTEC        Take by mouth daily   Refills:  0       fluticasone-salmeterol 250-50 MCG/DOSE diskus inhaler   Commonly known as:  ADVAIR        Dose:  1 puff   Inhale 1 puff into the lungs every 12 hours as needed   Refills:  0       priLOSEC  OTC 20 MG tablet   Generic drug:  omeprazole        Take  by mouth daily.   Refills:  0       PRISTIQ 50 MG 24 hr tablet   Generic drug:  desvenlafaxine succinate        Dose:  50 mg   Take 50 mg by mouth daily   Refills:  0         STOP taking     acetaminophen-codeine 300-30 MG per tablet   Commonly known as:  TYLENOL w/CODEINE No. 3           ferrous sulfate 142 (45 FE) MG Tbcr   Commonly known as:  SLO-FE           loratadine 10 MG tablet   Commonly known as:  CLARITIN                Where to get your medicines      Some of these will need a paper prescription and others can be bought over the counter. Ask your nurse if you have questions.     Bring a paper prescription for each of these medications     diazepam 5 MG tablet    oxyCODONE-acetaminophen 5-325 MG per tablet                Protect others around you: Learn how to safely use, store and throw away your medicines at www.disposemymeds.org.             Medication List: This is a list of all your medications and when to take them. Check marks below indicate your daily home schedule. Keep this list as a reference.      Medications           Morning Afternoon Evening Bedtime As Needed    albuterol 108 (90 BASE) MCG/ACT Inhaler   Commonly known as:  PROAIR HFA/PROVENTIL HFA/VENTOLIN HFA   Inhale 1-2 puffs into the lungs every 4 hours as needed for shortness of breath / dyspnea or wheezing                                   cetirizine 10 MG tablet   Commonly known as:  zyrTEC   Take by mouth daily                                diazepam 5 MG tablet   Commonly known as:  VALIUM   Take 1 tablet (5 mg) by mouth every 8 hours as needed for muscle spasms or pain (as needed for rectal/anal spasm)                                   fluticasone-salmeterol 250-50 MCG/DOSE diskus inhaler   Commonly known as:  ADVAIR   Inhale 1 puff into the lungs every 12 hours as needed                                oxyCODONE-acetaminophen 5-325 MG per tablet   Commonly known as:   PERCOCET   Take 1-2 tablets by mouth every 6 hours as needed for pain (moderate to severe)   Last time this was given:  2 tablets on 5/7/2017  6:44 AM                                   priLOSEC OTC 20 MG tablet   Take  by mouth daily.   Generic drug:  omeprazole                                PRISTIQ 50 MG 24 hr tablet   Take 50 mg by mouth daily   Generic drug:  desvenlafaxine succinate                                SYNTHROID PO   Take 150 mcg by mouth daily

## 2017-05-06 NOTE — ANESTHESIA CARE TRANSFER NOTE
Patient: Aman Tarango    Procedure(s):      INCISIONAL HEMORRHOIDECTOMY AND EXAM UNDER ANESTHESIA ANUS - Wound Class: II-Clean Contaminated    Diagnosis: Anal pain  Diagnosis Additional Information: No value filed.    Anesthesia Type:   General, ETT     Note:  Airway :Face Mask  Patient transferred to:PACU  Comments: Pt to PACU with O2 via mask, airway patent, VSS.  Report to RN.      Vitals: (Last set prior to Anesthesia Care Transfer)    CRNA VITALS  5/6/2017 1818 - 5/6/2017 1852      5/6/2017             Pulse: 83    SpO2: 100 %    Resp Rate (observed): 28    Resp Rate (set): 10                Electronically Signed By: ELIJAH Pedro CRNA  May 6, 2017  6:52 PM

## 2017-05-06 NOTE — ED PROVIDER NOTES
History     Chief Complaint:  Hemorrhoids      HPI   Aman Tarango is a 57 year old male who presents with hemorrhoids.  The patient reports he had banding of a hemorrhoid about 2 weeks ago and this since caused another hemorrhoid on the other side to thrombose, causing him increased pain.  He was seen in colorectal clinic at [facility] about 1 week ago for this but was told the thrombus was too small to excise at that time.  He was prescribed a topical numbing agent and pain medication.  He continues to have pain and bleeding with the pain gradually worsening as the area swells and relief of his pain after using the bathroom.  He has been up most of the night with this, having to use the bathroom almost every hour.  He denies any constipation and is having normal stools.  He called his colorectal surgeon and was told to come to [facility] because the on call physician is here and could see him if needed.  He is currently on iron due to being anemic from the amount of bleeding.    Allergies:  No known drug allergies.     Medications:    Prilosec  Synthroid      Past Medical History:    Hypothyroidism  Hemorrhoids     Past Surgical History:    Hemorrhoid banding   Hernia repair  Laryngoscopy with vocal cord injection    Family History:    Prostate cancer - father    Social History:  Marital Status:   Presents to the ED alone.  Tobacco Use: No previous or current tobacco use.  Alcohol Use: Occasional social alcohol use.   PCP: Kwaku Gonzales MD     Review of Systems   Constitutional: Negative for chills and fever.   Respiratory: Negative for shortness of breath.    Gastrointestinal: Positive for anal bleeding and rectal pain. Negative for abdominal pain and constipation.   All other systems reviewed and are negative.    Physical Exam   First Vitals:  BP: (!) 144/94  Pulse: 111  Heart Rate: 111  Temp: 97.7  F (36.5  C)  Resp: 16  Weight: 90.7 kg (200 lb)  SpO2: 99 %      Physical  Exam  General: White male supine.  Abdomen: Soft, no tenderness or masses, 2+ femoral pulses.  : Circumcised penis, bilateral descended testes without tenderness  Rectal: Painful, small amount of blood on glove tip, do not appreciate any thrombosed external hemorrhoids.  Neuro: Awake, alert, and appropriate.    Emergency Department Course     Laboratory:  CBC:  WBC 7.1, HGB 9.9, , otherwise WNL     Procedures:  Anoscopy attempted but patient unable to tolerate due to pain    Emergency Department Course:  Nursing notes and vitals reviewed.  I performed an exam of the patient as documented above.     I attempted anoscopy, which the patient was unable to tolerate due to pain.    A peripheral IV was established. Blood was drawn from the patient. This was sent for laboratory testing, findings above.       (8951) I spoke with Dr. Ball of colorectal surgery regarding the patient.  She will come to the ED to evaluate the patient further.    Impression & Plan      Medical Decision Making:  Mr. Tarango is a 57 year old male who comes here because of painful bleeding hemorrhoids.  Patient several weeks ago underwent hemorrhoid banding on 1 side.  Since then he has continued to have pain and feels that he has swelling on the other side.  He did see colorectal PA less than a week ago, was given pain medicine.  He states he is having bowel movement movements but that the pain builds up, things swell, and then he passes blood and then he is okay for a while.  He has no belly pain, nausea, vomiting, fevers, or chills.  On exam, he has a nontender abdomen.  On rectal I see no obvious hemorrhoids.  Whenever I try to do a digital or anoscopic exam, patient cannot tolerate this.  I have contacted Dr. Fannie Ball, colorectal on call, and she will come to the ED to see the patient and take him to the OR for exam under anesthesia.      Diagnosis:    ICD-10-CM    1. Bleeding hemorrhoid K64.9  and pain     Plan:  As noted        I, Jamia Allan, am serving as a scribe on 5/6/2017 at 12:58 PM to personally document services performed by Dr. Wolf based on my observations and the provider's statements to me.    Jamia Allan  5/6/2017    EMERGENCY DEPARTMENT       Sonny Wolf MD  05/07/17 0702

## 2017-05-06 NOTE — CONSULTS
[facility]  Colon [facility]al [facility] Consult Note  Name: Aman Tarango    MRN: [id number]  YOB: 1960    Age: 57 year old  Date of admission: 5/6/2017  Primary care provider: Ash Boucher     Requesting Physician:  Dr. Wolf, ER  Reason for consult:  Anal pain           History of Present Illness:   Aman Tarango is a 57 year old male who presents with a history of large, bleeding hemorrhoids.  He underwent rubber band ligation of a large internal hemorrhoid in the left lateral position on 4/27/16.  He subsequently developed pain and a bulge on the right side of his anus on Monday 5/1/17.  He was seen in clinic at that time for pain and increased bleeding.  It was determined to manage his symptoms conservatively with warm baths and prn pain meds.  His pain has been continuous since that time.  His pain intensifies until he is able to pass some bloody mucous or a bowel movement.  Then his pain will lessen for about 30 minutes.  The pain always returns and is only relieved by passing something per rectum.  The pain is in the rectal area and shoots down his right leg.  He is unable to sit or stand for a long time.  His stools have been normal.  He is able to void urine without difficulty.  He has been taking narcotic pain meds without relief.   He is supposed to go out of town tomorrow.                Past Medical History:     Past Medical History:   Diagnosis Date     Granuloma of vocal cords              Past Surgical History:     Past Surgical History:   Procedure Laterality Date     C LAP,HERNIA REPAIR PROC,UNLIST  ~5-6 yrs ago     HC LARYNGOSCOPY DIRECT W VOCAL CORD INJECTION  3 in last 12 months      HEMORRHOID SURGERY                 Social History:     Social History   Substance Use Topics     Smoking status: Never Smoker     Smokeless tobacco: Not on file     Alcohol use Yes      Comment: socially             Family History:     Family History   Problem  Relation Age of Onset     Prostate Cancer Father      DIABETES Maternal Grandfather      Hypertension Maternal Grandfather              Allergies:   No Known Allergies          Medications:             Review of Systems:   A comprehensive greater than 10 system review of systems was carried out.  Pertinent positives and negatives are noted above.  Otherwise negative for contributory info.            Physical Exam:   Blood pressure 110/70, pulse 91, temperature 97.7  F (36.5  C), temperature source Oral, resp. rate 16, weight 90.7 kg (200 lb), SpO2 95 %.  No intake or output data in the 24 hours ending 05/06/17 1442  Exam:  General - Awake alert and oriented  Pulm - Non labored breathing  CVS - reg rate and rhythm  Abd - soft, nontender  Ext - warm without edema  Rectal - perianal skin is normal appearing without external hemorrhoids, fissures or fistulas.  The perianal skin is nontender.  Deeper palpation just inside the anal verge is incredibly tender circumferentially.  He cannot tolerated a LEIGHA or separation of the buttocks to eface the anal verge.  No obvious bleeding.          Data:   No results found for this or any previous visit (from the past 24 hour(s)).        Assessment and Plan:   58 yo male with history of large, bleeding internal hemorrhoids. He underwent rubber band ligation of a left lateral internal hemorrhoid on 4/27/16.  He then developed increased pain and rectal pressure on 5/1/17.  This pain is only relieved by passing bloody mucous or stool.  The pain is 10/10 and associated with shooting pain down his right leg.  There is nothing visible on external exam.  He is unable to tolerated internal exam due to pain.   Plan:  1. Strict NPO in preparation for surgery.   2. Plan for exam under general anesthesia to better determine cause of pain and blood clots.  The risks and benefits of surgery were discussed with the patient and he would like to proceed.  Surgery cannot be done before 5pm due to  eating restrictions for anesthesia  3. IV pain meds and IVF as needed.  4. Admit to observation unit until able to go to the OR.  Anticipate that he can d/c to home after surgery  5. Check CBC now  6. No indication for Abx at this point.         Fannie Ball MD  [facility]  Pager:  [phone]  Phone: [phone]  Fax: [phone]

## 2017-05-07 VITALS
OXYGEN SATURATION: 97 % | WEIGHT: 200 LBS | SYSTOLIC BLOOD PRESSURE: 119 MMHG | TEMPERATURE: 97.4 F | RESPIRATION RATE: 16 BRPM | DIASTOLIC BLOOD PRESSURE: 70 MMHG | HEART RATE: 97 BPM

## 2017-05-07 PROCEDURE — 25000132 ZZH RX MED GY IP 250 OP 250 PS 637: Performed by: COLON & RECTAL SURGERY

## 2017-05-07 PROCEDURE — G0378 HOSPITAL OBSERVATION PER HR: HCPCS

## 2017-05-07 PROCEDURE — 25000128 H RX IP 250 OP 636: Performed by: COLON & RECTAL SURGERY

## 2017-05-07 RX ORDER — CALCIUM CARBONATE 500 MG/1
500-1000 TABLET, CHEWABLE ORAL
Status: DISCONTINUED | OUTPATIENT
Start: 2017-05-07 | End: 2017-05-07 | Stop reason: HOSPADM

## 2017-05-07 RX ADMIN — HYDROMORPHONE HYDROCHLORIDE 0.5 MG: 1 INJECTION, SOLUTION INTRAMUSCULAR; INTRAVENOUS; SUBCUTANEOUS at 05:51

## 2017-05-07 RX ADMIN — HYDROMORPHONE HYDROCHLORIDE 0.5 MG: 1 INJECTION, SOLUTION INTRAMUSCULAR; INTRAVENOUS; SUBCUTANEOUS at 01:16

## 2017-05-07 RX ADMIN — OXYCODONE HYDROCHLORIDE AND ACETAMINOPHEN 2 TABLET: 5; 325 TABLET ORAL at 06:44

## 2017-05-07 RX ADMIN — IBUPROFEN 600 MG: 600 TABLET ORAL at 04:09

## 2017-05-07 RX ADMIN — IBUPROFEN 600 MG: 600 TABLET ORAL at 11:35

## 2017-05-07 RX ADMIN — HYDROMORPHONE HYDROCHLORIDE 0.5 MG: 1 INJECTION, SOLUTION INTRAMUSCULAR; INTRAVENOUS; SUBCUTANEOUS at 07:44

## 2017-05-07 RX ADMIN — HYDROMORPHONE HYDROCHLORIDE 0.5 MG: 1 INJECTION, SOLUTION INTRAMUSCULAR; INTRAVENOUS; SUBCUTANEOUS at 04:12

## 2017-05-07 RX ADMIN — CALCIUM CARBONATE (ANTACID) CHEW TAB 500 MG 1000 MG: 500 CHEW TAB at 05:53

## 2017-05-07 ASSESSMENT — PAIN DESCRIPTION - DESCRIPTORS: DESCRIPTORS: CONSTANT;PRESSURE

## 2017-05-07 NOTE — PLAN OF CARE
Problem: Goal Outcome Summary  Goal: Goal Outcome Summary  Outcome: Improving  Care Plan Summary Note: Pt A&Ox4 , VSS, afebrile. CMS intact.  C/o 3-6/10 rectum pain managed by IV dilaudid & Nausea which resolved with Zofran. Denies headache, SOB, dyspnea. Regular diet, IVF saline locked. Continue to monitor.

## 2017-05-07 NOTE — OR NURSING
Dr. Ball notified on patients status.  Pt to go to observation for the night.  Order also obtained for IV tylenol and prn dilaudid on floor.

## 2017-05-07 NOTE — BRIEF OP NOTE
[facility] Brief Operative Note    Pre-operative diagnosis: Anal pain   Post-operative diagnosis 1. Anal pain  2. Bleeding internal hemorrhoids   Procedure: 1. Exam under anesthesia  2. Excisional hemorrhoidectomy x2 (bleeding internal hemorrhoids)   Surgeon(s): Surgeon(s) and Role:     * Fannie Ball MD - Primary   Estimated blood loss: 50 mL    Specimens:   ID Type Source Tests Collected by Time Destination   A : HEMORRHOID Tissue Anus SURGICAL PATHOLOGY EXAM Fannie Ball MD 5/6/2017  6:08 PM       Findings: Area of prior rubber band ligation in left lateral position.  Two, large, bleeding internal hemorrhoids in right posterior and right anterior position.     Fannie Ball MD  [facility]  Pager:  [phone]  Phone: [phone]  Fax: [phone]

## 2017-05-07 NOTE — PHARMACY-ADMISSION MEDICATION HISTORY
Admission medication history interview status for the 5/6/2017  admission is complete. See EPIC admission navigator for prior to admission medications     Medication history source reliability:Good    Actions taken by pharmacist (provider contacted, etc): used care everywhere and reviewed with patient     Additional medication history information not noted on PTA med list : pristique, advair, albuterol, iron sulfate, increase in synthroid zyrtec    Medication reconciliation/reorder completed by provider prior to medication history? Yes    Time spent in this activity: 25 min    Prior to Admission medications    Medication Sig Last Dose Taking? Auth Provider   oxyCODONE-acetaminophen (PERCOCET) 5-325 MG per tablet Take 1-2 tablets by mouth every 6 hours as needed for pain (moderate to severe)  Yes Fannie Ball MD   diazepam (VALIUM) 5 MG tablet Take 1 tablet (5 mg) by mouth every 8 hours as needed for muscle spasms or pain (as needed for rectal/anal spasm)  Yes Fannie Ball MD   Levothyroxine Sodium (SYNTHROID PO) Take 150 mcg by mouth daily 5/6/2017 at am Yes Unknown, Entered By History   desvenlafaxine succinate (PRISTIQ) 50 MG 24 hr tablet Take 50 mg by mouth daily 5/6/2017 at am Yes Unknown, Entered By History   cetirizine (ZYRTEC) 10 MG tablet Take by mouth daily 5/6/2017 at am Yes Unknown, Entered By History   fluticasone-salmeterol (ADVAIR) 250-50 MCG/DOSE diskus inhaler Inhale 1 puff into the lungs every 12 hours as needed prn Yes Unknown, Entered By History   albuterol (PROAIR HFA/PROVENTIL HFA/VENTOLIN HFA) 108 (90 BASE) MCG/ACT Inhaler Inhale 1-2 puffs into the lungs every 4 hours as needed for shortness of breath / dyspnea or wheezing prn Yes Unknown, Entered By History   ferrous sulfate (SLO-FE) 142 (45 FE) MG TBCR Take 142 mg by mouth daily 5/6/2017 at am Yes Unknown, Entered By History   PRILOSEC OTC 20 MG PO TBEC Take  by mouth daily. 5/6/2017 at AM Yes Reported, Patient

## 2017-05-07 NOTE — OP NOTE
PREOPERATIVE DIAGNOSIS: Anal pain     POSTOPERATIVE DIAGNOSIS:   1. Anal pain  2. Bleeding internal hemorrhoids in right posterior and right anterior position    OPERATION PERFORMED: Excision of bleeding internal hemorrhoids in the right posterior and right anterior positions     SURGEON: Fannie Ball MD     ANESTHESIA: General.     INDICATION:  Aman Tarango is a 57 year old male with a history of anemia secondary to large, bleeding internal hemorrhoids.  He underwent rubber band ligation to a large internal hemorrhoid in the left lateral position on 4/27/2017.  He presented with worsening anal pain on 5/1/2017, but no significant clinical exam findings.  He has continued to do warm sitz baths several times daily and take narcotic pain meds with minimal improvement in his pain.  He describes the pain as being a pressure-like pain in the anorectal area.  He also has pain that shoots down his right leg.  His pain is only relieved by passing stool or blood clots per rectum.  Due to his ongoing pain and bleeding he will now undergo exam under anesthesia.  Informed consent has been obtained.     OPERATIVE FINDINGS: The external perianal skin appears normal and without external hemorrhoids, fissures or fistulas.  Anoscopic exam reveals a healing, ulcerated are in the left lateral region from recent rubber band ligation.  The ulceration is located 2 cm above the dentate line and is non-bleeding.  There are two large and actively bleeding internal hemorrhoids in the right posterior and right anterior position.  Both bleeding hemorrhoids were excised.    PROCEDURE IN DETAIL: After informed consent was obtained, the patient was brought to the operating room where general anesthesia was induced without difficulty. He was flipped into a well-padded prone jackknife position, and the buttocks taped apart. The perineum was sterilely prepped and draped in standard fashion.   A digital rectal exam was performed and was  normal. A Romero [place] retractor was then inserted into the anal canal and the operative findings are noted above.  The left lateral internal hemorrhoid was small in size and the area of ulceration from recent rubber band ligation was healthy and not bleeding. The large and actively bleeding internal hemorrhoids located in the right posterior and right anterior positions were excised in the following similar manner, beginning with the right posterior hemorrhoid. The hemorrhoid was grasped with a Marlene clamp, and the external perianal skin was pulled laterally so as to create a enrrique shape. The hemorrhoid was then excised using Bovie electrocautery, taking great care to preserve the underlying sphincter fibers at all times. Once the Bovie electrocautery had been used to excise the hemorrhoid up to the base, a 3-0 Vicryl suture was used to first suture ligate the base and facilitate total excision of the hemorrhoid. The hemorrhoid was then passed off the operative field to be sent to pathology. Any bleeding left from the excision was meticulously controlled with Bovie electrocautery.  A 3-0 Vicryl suture was used to close the defect in a running locked fashion in the internal component. Two additional sutures were placed at the proximal base of the hemorrhoidal pedicle. The anal canal was irrigated with saline solution, and hemostasis was adequate.   The right anterior hemorrhoid was then excised in a similar fashion. Again, once the Bovie electrocautery had been used to excise the hemorrhoid up to the hemorrhoidal base, a 3-0 Vicryl suture was used to suture ligate the pedicle of the hemorrhoid, followed by excision of hemorrhoid. This hemorrhoid was also sent to pathology for examination. Bovie electrocautery was used to achieve hemostasis. The mucosal defect was then closed with 3-0 Vicryl suture in running locked fashion. Again, the anal canal was irrigated with saline solution, and hemostasis was adequate.    An anal field block consisting of 60 mL of 1% Lidocaine with epinephrine mixed with 0.5% Marcaine was instilled into the four quadrants of the anal canal for prolonged postoperative analgesia. A sterile gauze dressing was then applied.   The patient tolerated this procedure well without complications. Estimated blood loss was 50 mL. I was present and scrubbed for the entire duration of the operation. The patient was returned to the supine position and extubated in the operating room. He was brought to the recovery room in stable condition.     TACHO SINGH MD

## 2017-05-07 NOTE — ANESTHESIA POSTPROCEDURE EVALUATION
Patient: Aman Tarango    Procedure(s):      INCISIONAL HEMORRHOIDECTOMY AND EXAM UNDER ANESTHESIA ANUS - Wound Class: II-Clean Contaminated    Diagnosis:Anal pain  Diagnosis Additional Information: No value filed.    Anesthesia Type:  General, ETT    Note:  Anesthesia Post Evaluation    Patient location during evaluation: PACU  Patient participation: Able to fully participate in evaluation  Level of consciousness: awake  Pain management: adequate  Cardiovascular status: acceptable  Respiratory status: acceptable  Hydration status: acceptable  PONV: none     Anesthetic complications: None          Last vitals:  Vitals:    05/06/17 1930 05/06/17 1940 05/06/17 1949   BP: (!) 141/94 141/82    Pulse:      Resp: 16 21    Temp:  36.7  C (98.1  F)    SpO2: 99% 100% 99%         Electronically Signed By: BRENDA HARTLEY  May 6, 2017  7:54 PM

## 2017-05-07 NOTE — PROGRESS NOTES
Observation goals: Intractable Pain PRIOR TO DISCHARGE     Comments: List all goals to be met before discharge home:   -Adequate pain control using oral analgesics: not met, still needing IV dilaudid  -Tolerates oral intake: will start regular diet this AM  -Cleared for discharge per provider: pending

## 2017-05-07 NOTE — PLAN OF CARE
Problem: Goal Outcome Summary  Goal: Goal Outcome Summary  Outcome: Adequate for Discharge Date Met:  05/07/17  Pt VSS, received IV dilaudid, ibuprofen for pain.  Pt voided, up ind, eating regular diet. No bleeding from surgical site.  Discharge instructions and medications reviewed with pt and pt's wife. Pt belongings and meds in hand with pt.  Pt to d/c home with pt wife.

## 2017-05-07 NOTE — PLAN OF CARE
Problem: Goal Outcome Summary  Goal: Goal Outcome Summary  Outcome: No Change  A&O. VSS. Dilaudid, Ibuprofen, and ice packs managing pain. Now transitioned over to Percocet this morning. Denies nausea, though had a little heartburn this morning, in which he was given TUMS. Pt complained of feeling like he needed to have a BM. Nurse advised patient that if he needed to have a BM, to not strain or push at all, however, patient stated that he did end up straining anyway and passed some gas and small amount of blood. Throughout the night patient has had scant serosanguinous anal drainage. No clots noted. Voiding adequately. Tolerating diet. Up SBA.

## 2017-05-08 NOTE — DISCHARGE SUMMARY
[facility] Discharge Summary      Aman Tarango MRN# [id number]   Age: 57 year old YOB: 1960     Date of Admission:  5/6/2017  Date of Discharge::  5/7/2017 12:34 PM  Admitting Physician:  Fannie Ball MD  Discharge Physician:  Fannie Ball MD     PCP:  Ash Boucher    Disposition: Patient discharged from [facility] to home in stable condition.        Primary Diagnosis:   Hemorrhoids            Discharge Medications:   Discharge Medication List as of 5/7/2017 10:55 AM      START taking these medications    Details   oxyCODONE-acetaminophen (PERCOCET) 5-325 MG per tablet Take 1-2 tablets by mouth every 6 hours as needed for pain (moderate to severe), Disp-50 tablet, R-0, Local Print      diazepam (VALIUM) 5 MG tablet Take 1 tablet (5 mg) by mouth every 8 hours as needed for muscle spasms or pain (as needed for rectal/anal spasm), Disp-20 tablet, R-0, Local Print         CONTINUE these medications which have NOT CHANGED    Details   Levothyroxine Sodium (SYNTHROID PO) Take 150 mcg by mouth daily, Historical      desvenlafaxine succinate (PRISTIQ) 50 MG 24 hr tablet Take 50 mg by mouth daily, Historical      cetirizine (ZYRTEC) 10 MG tablet Take by mouth daily, Historical      fluticasone-salmeterol (ADVAIR) 250-50 MCG/DOSE diskus inhaler Inhale 1 puff into the lungs every 12 hours as needed, Historical      albuterol (PROAIR HFA/PROVENTIL HFA/VENTOLIN HFA) 108 (90 BASE) MCG/ACT Inhaler Inhale 1-2 puffs into the lungs every 4 hours as needed for shortness of breath / dyspnea or wheezing, Historical      PRILOSEC OTC 20 MG PO TBEC Take  by mouth daily., Oral, DAILY, Until Discontinued, Historical         STOP taking these medications       loratadine (CLARITIN) 10 MG tablet Comments:   Reason for Stopping:         ferrous sulfate (SLO-FE) 142 (45 FE) MG TBCR Comments:   Reason for Stopping:         acetaminophen-codeine 300-30 MG per tablet Comments:    Reason for Stopping:                      Follow Up, Special Instructions:   Discharge diet: Regular   Discharge activity: Activity as tolerated   Discharge follow-up: Follow up with Dr. Dove or PA in 6-8 weeks   Wound care: Keep wound clean and dry              Procedures:   Procedure(s): Excisional hemorrhoidectomy              Consultations:   None          Brief Hospital Summary:   Patient is a 57 year old man.  he underwent an excisional hemorrhoidectomy on 5/6/17 by Dr Ball.   There were no immediate complications during this procedure.    Please refer to the full operative summary for details.  The patient's hospital course was unremarkable. He was able to void well, his pain was controlled and he tolerated diet advancement. He recovered as anticipated and experienced no post-operative complications. He discharged home on POD#1        Attestation:  I have reviewed today's vital signs, notes, medications, labs and imaging.    Homa Vaughan PA-C  [facility]

## 2017-05-09 ENCOUNTER — HOSPITAL ENCOUNTER (EMERGENCY)
Facility: CLINIC | Age: 57
Discharge: HOME | End: 2017-05-09
Attending: EMERGENCY MEDICINE | Admitting: EMERGENCY MEDICINE
Payer: COMMERCIAL

## 2017-05-09 VITALS
HEART RATE: 91 BPM | HEIGHT: 72 IN | TEMPERATURE: 98.5 F | RESPIRATION RATE: 18 BRPM | DIASTOLIC BLOOD PRESSURE: 74 MMHG | BODY MASS INDEX: 27.77 KG/M2 | WEIGHT: 205 LBS | SYSTOLIC BLOOD PRESSURE: 118 MMHG | OXYGEN SATURATION: 100 %

## 2017-05-09 DIAGNOSIS — K59.00 CONSTIPATION, UNSPECIFIED CONSTIPATION TYPE: ICD-10-CM

## 2017-05-09 DIAGNOSIS — R33.9 URINARY RETENTION: ICD-10-CM

## 2017-05-09 LAB — COPATH REPORT: NORMAL

## 2017-05-09 PROCEDURE — 51702 INSERT TEMP BLADDER CATH: CPT

## 2017-05-09 PROCEDURE — 99283 EMERGENCY DEPT VISIT LOW MDM: CPT

## 2017-05-09 NOTE — ED AVS SNAPSHOT
Emergency Department    [address] [facility] [address]    Phone:  [phone]    Fax:  [phone]                                       Aman Tarango   MRN: [id number]    Department:   Emergency Department   Date of Visit:  5/9/2017           Patient Information     Date Of Birth          1960        Your diagnoses for this visit were:     Urinary retention     Constipation, unspecified constipation type        You were seen by Aman Shah MD.      Follow-up Information     Follow up with Ash Boucher MD.    Specialty:  Family Practice    Contact information:    [address] [place]  [facility] [address] [phone]          Discharge Instructions         May try miralax daily for constipation  May try Fleets enema.  May try glycerin suppositories        Constipation (Adult)  Constipation means that you have bowel movements that are less frequent than usual. Stools often become very hard and difficult to pass.  Constipation is very common. At some point in life it affects almost everyone. Since everyone's bowel habits are different, what is constipation to one person may not be to another. Your healthcare provider may do tests to diagnose constipation. It depends on what he or she finds when evaluating you.    Symptoms of constipation include:    Abdominal pain    Bloating    Vomiting    Painful bowel movements    Itching, swelling, bleeding, or pain around the anus  Causes  Constipation can have many causes. These include:    Diet low in fiber    Too much dairy    Not drinking enough liquids    Lack of exercise or physical activity. This is especially true for older adults.    Changes in lifestyle or daily routine, including pregnancy, aging, work, and travel    Frequent use or misuse of laxatives    Ignoring the urge to have a bowel movement or delaying it until later    Medicines, such as certain prescription pain medicines, iron  supplements, antacids, certain antidepressants, and calcium supplements    Diseases like irritable bowel syndrome, bowel obstructions, stroke, diabetes, thyroid disease, Parkinson disease, hemorrhoids, and colon cancer  Complications  Potential complications of constipation can include:    Hemorrhoids    Rectal bleeding from hemorrhoids or anal fissures (skin tears)    Hernias    Dependency on laxatives    Chronic constipation    Fecal impaction    Bowel obstruction or perforation  Home care  All treatment should be done after talking with your healthcare provider. This is especially true if you have another medical problems, are taking prescription medicines, or are an older adult. Treatment most often involves lifestyle changes. You may also need medicines. Your healthcare provider will tell you which will work best for you. Follow the advice below to help avoid this problem in the future.  Lifestyle changes  These lifestyle changes can help prevent constipation:    Diet. Eat a high-fiber diet, with fresh fruit and vegetables, and reduce dairy intake, meats, and processed foods    Fluids. It's important to get enough fluids each day. Drink plenty of water when you eat more fiber. If you are on diet that limits the amount of fluid you can have, talk about this with your healthcare provider.    Regular exercise. Check with your healthcare provider first.  Medications  Take any medicines as directed. Some laxatives are safe to use only every now and then. Others can be taken on a regular basis. Talk with your doctor or pharmacist if you have questions.  Prescription pain medicines can cause constipation. If you are taking this kind of medicine, ask your healthcare provider if you should also take a stool softener.  Medicines you may take to treat constipation include:    Fiber supplements    Stool softeners    Laxatives    Enemas    Rectal suppositories  Follow-up care  Follow up with your healthcare provider if  symptoms don't get better in the next few days. You may need to have more tests or see a specialist.  Call 911  Call 911 if any of these occur:    Trouble breathing    Stiff, rigid abdomen that is severely painful to touch    Confusion    Fainting or loss of consciousness    Rapid heart rate    Chest pain  When to seek medical advice  Call your healthcare provider right away if any of these occur:    Fever over 100.4 F (38 C)    Failure to resume normal bowel movements    Pain in your abdomen or back gets worse    Nausea or vomiting    Swelling in your abdomen    Blood in the stool    Black, tarry stool    Involuntary weight loss    Weakness    4689-4396 [employer]. [address] [place] [address]. All rights reserved. This information is not intended as a substitute for professional medical care. Always follow your healthcare professional's instructions.          24 Hour Appointment Hotline       To make an appointment at any [facility], call [phone] ([phone]). If you don't have a family doctor or clinic, we will help you find one. [facility] clinics are conveniently located to serve the needs of you and your family.             Review of your medicines      Our records show that you are taking the medicines listed below. If these are incorrect, please call your family doctor or clinic.        Dose / Directions Last dose taken    albuterol 108 (90 BASE) MCG/ACT Inhaler   Commonly known as:  PROAIR HFA/PROVENTIL HFA/VENTOLIN HFA   Dose:  1-2 puff        Inhale 1-2 puffs into the lungs every 4 hours as needed for shortness of breath / dyspnea or wheezing   Refills:  0        cetirizine 10 MG tablet   Commonly known as:  zyrTEC        Take by mouth daily   Refills:  0        diazepam 5 MG tablet   Commonly known as:  VALIUM   Dose:  5 mg   Quantity:  20 tablet        Take 1 tablet (5 mg) by mouth every 8 hours as needed for muscle spasms or pain (as needed for rectal/anal  spasm)   Refills:  0        fluticasone-salmeterol 250-50 MCG/DOSE diskus inhaler   Commonly known as:  ADVAIR   Dose:  1 puff        Inhale 1 puff into the lungs every 12 hours as needed   Refills:  0        oxyCODONE-acetaminophen 5-325 MG per tablet   Commonly known as:  PERCOCET   Dose:  1-2 tablet   Quantity:  50 tablet        Take 1-2 tablets by mouth every 6 hours as needed for pain (moderate to severe)   Refills:  0        priLOSEC OTC 20 MG tablet   Generic drug:  omeprazole        Take  by mouth daily.   Refills:  0        PRISTIQ 50 MG 24 hr tablet   Dose:  50 mg   Generic drug:  desvenlafaxine succinate        Take 50 mg by mouth daily   Refills:  0        SYNTHROID PO   Dose:  150 mcg        Take 150 mcg by mouth daily   Refills:  0                Procedures and tests performed during your visit     Indwelling urinary catheter (Thomas)       Orders Needing Specimen Collection     None      Pending Results     No orders found from 5/7/2017 to 5/10/2017.            Pending Culture Results     No orders found from 5/7/2017 to 5/10/2017.            Pending Results Instructions     If you had any lab results that were not finalized at the time of your Discharge, you can call the ED Lab Result RN at [phone]. You will be contacted by this team for any positive Lab results or changes in treatment. The nurses are available 7 days a week from 10A to 6:30P.  You can leave a message 24 hours per day and they will return your call.        Test Results From Your Hospital Stay               Clinical Quality Measure: Blood Pressure Screening     Your blood pressure was checked while you were in the emergency department today. The last reading we obtained was  BP: 147/72 . Please read the guidelines below about what these numbers mean and what you should do about them.  If your systolic blood pressure (the top number) is less than 120 and your diastolic blood pressure (the bottom number) is less than 80, then your  "blood pressure is normal. There is nothing more that you need to do about it.  If your systolic blood pressure (the top number) is 120-139 or your diastolic blood pressure (the bottom number) is 80-89, your blood pressure may be higher than it should be. You should have your blood pressure rechecked within a year by a primary care provider.  If your systolic blood pressure (the top number) is 140 or greater or your diastolic blood pressure (the bottom number) is 90 or greater, you may have high blood pressure. High blood pressure is treatable, but if left untreated over time it can put you at risk for heart attack, stroke, or kidney failure. You should have your blood pressure rechecked by a primary care provider within the next 4 weeks.  If your provider in the emergency department today gave you specific instructions to follow-up with your doctor or provider even sooner than that, you should follow that instruction and not wait for up to 4 weeks for your follow-up visit.        Thank you for choosing [facility]       Thank you for choosing [facility] for your care. Our goal is always to provide you with excellent care. Hearing back from our patients is one way we can continue to improve our services. Please take a few minutes to complete the written survey that you may receive in the mail after you visit with us. Thank you!        [facility] Information     i'mma lets you send messages to your doctor, view your test results, renew your prescriptions, schedule appointments and more. To sign up, go to [email or web address] . Click on \"Log in\" on the left side of the screen, which will take you to the Welcome page. Then click on \"Sign up Now\" on the right side of the page.     You will be asked to enter the access code listed below, as well as some personal information. Please follow the directions to create your username and password.     Your access code is: [email or web address]  Expires: 8/4/2017  6:49 PM     Your access " code will  in 90 days. If you need help or a new code, please call your [place] clinic or [phone].        Care EveryWhere ID     This is your Care EveryWhere ID. This could be used by other organizations to access your [facility] medical records  [id number]        After Visit Summary       This is your record. Keep this with you and show to your community pharmacist(s) and doctor(s) at your next visit.

## 2017-05-09 NOTE — ED AVS SNAPSHOT
Emergency Department    [address] [facility] [address]    Phone:  [phone]    Fax:  [phone]                                       Aman Tarango   MRN: [id number]    Department:   Emergency Department   Date of Visit:  5/9/2017           After Visit Summary Signature Page     I have received my discharge instructions, and my questions have been answered. I have discussed any challenges I see with this plan with the nurse or doctor.    ..........................................................................................................................................  Patient/Patient Representative Signature      ..........................................................................................................................................  Patient Representative Print Name and Relationship to Patient    ..................................................               ................................................  Date                                            Time    ..........................................................................................................................................  Reviewed by Signature/Title    ...................................................              ..............................................  Date                                                            Time

## 2017-05-10 NOTE — ED PROVIDER NOTES
CHIEF COMPLAINT:  Unable to urinate.      HISTORY OF PRESENT ILLNESS:  Aman Tarango is a 57-year-old male who is approximately two days status post hemorrhoid surgery, and notes that he has not urinated since yesterday, and feels like he may be retaining.  He has had problems similarly to this in the past when taking pain medications.  He also notes that he has not had a bowel movement since his surgery on Saturday, but does not really have the urge to go.  He still has some mild rectal pain.      PAST MEDICAL HISTORY:  Significant for hemorrhoids, asthma.      CURRENT MEDICATIONS:  Albuterol, Synthroid, Prilosec.      FAMILY HISTORY:  No significant family history.      SOCIAL HISTORY:  He is a nonsmoker.  Occasionally uses alcohol.  No use of other drugs.      REVIEW OF SYSTEMS:  All other systems reviewed and are negative other than as described in HPI.      PHYSICAL EXAM:   VITAL SIGNS:  Oral temperature 98.5, pulse 101, respirations 18, blood pressure 130/82, pulse ox 99% on room air.   GENERAL:  He is alert, oriented x3, lying on the cart, mildly uncomfortable.   HEENT:  Head is atraumatic, normocephalic.  Eyes normal.  Ears, nose and throat normal.   ABDOMEN:  Soft, nondistended.  He has mild suprapubic tenderness.   EXTREMITIES:  Without cyanosis or edema.   SKIN:  Warm and dry without rash.      ASSESSMENT/PLAN/EMERGENCY DEPARTMENT COURSE:  Based on the patient's history, he is having urinary retention.  Bladder showed about 250-300 cc of urine in the bladder.  A Thomas catheter was placed and drained 300 cc of urine.  He felt significantly better.  I discussed with him that this may be more due to constipation, and we discussed possible treatment for that here, but thinks he can manage this at home.  I have recommended daily MiraLax.  He has done enemas in the past and is comfortable giving himself a Fleet's enema.  If he should continue to have problems, he can return to the emergency department or  follow up with his primary physician.      DIAGNOSES:   1.  Urinary retention.   2.  Constipation.         CHRISTINE PERKINS MD             D: 2017 23:48   T: 05/10/2017 00:42   MT: NISA#[id number]      Name:     CHRISTINE LAW   MRN:      [id number]        Account:      [id number]   :      1960           Visit Date:   2017      Document: [id number]       cc: Ash Ball MD

## 2017-05-10 NOTE — DISCHARGE INSTRUCTIONS
May try miralax daily for constipation  May try Fleets enema.  May try glycerin suppositories        Constipation (Adult)  Constipation means that you have bowel movements that are less frequent than usual. Stools often become very hard and difficult to pass.  Constipation is very common. At some point in life it affects almost everyone. Since everyone's bowel habits are different, what is constipation to one person may not be to another. Your healthcare provider may do tests to diagnose constipation. It depends on what he or she finds when evaluating you.    Symptoms of constipation include:    Abdominal pain    Bloating    Vomiting    Painful bowel movements    Itching, swelling, bleeding, or pain around the anus  Causes  Constipation can have many causes. These include:    Diet low in fiber    Too much dairy    Not drinking enough liquids    Lack of exercise or physical activity. This is especially true for older adults.    Changes in lifestyle or daily routine, including pregnancy, aging, work, and travel    Frequent use or misuse of laxatives    Ignoring the urge to have a bowel movement or delaying it until later    Medicines, such as certain prescription pain medicines, iron supplements, antacids, certain antidepressants, and calcium supplements    Diseases like irritable bowel syndrome, bowel obstructions, stroke, diabetes, thyroid disease, Parkinson disease, hemorrhoids, and colon cancer  Complications  Potential complications of constipation can include:    Hemorrhoids    Rectal bleeding from hemorrhoids or anal fissures (skin tears)    Hernias    Dependency on laxatives    Chronic constipation    Fecal impaction    Bowel obstruction or perforation  Home care  All treatment should be done after talking with your healthcare provider. This is especially true if you have another medical problems, are taking prescription medicines, or are an older adult. Treatment most often involves lifestyle changes. You  may also need medicines. Your healthcare provider will tell you which will work best for you. Follow the advice below to help avoid this problem in the future.  Lifestyle changes  These lifestyle changes can help prevent constipation:    Diet. Eat a high-fiber diet, with fresh fruit and vegetables, and reduce dairy intake, meats, and processed foods    Fluids. It's important to get enough fluids each day. Drink plenty of water when you eat more fiber. If you are on diet that limits the amount of fluid you can have, talk about this with your healthcare provider.    Regular exercise. Check with your healthcare provider first.  Medications  Take any medicines as directed. Some laxatives are safe to use only every now and then. Others can be taken on a regular basis. Talk with your doctor or pharmacist if you have questions.  Prescription pain medicines can cause constipation. If you are taking this kind of medicine, ask your healthcare provider if you should also take a stool softener.  Medicines you may take to treat constipation include:    Fiber supplements    Stool softeners    Laxatives    Enemas    Rectal suppositories  Follow-up care  Follow up with your healthcare provider if symptoms don't get better in the next few days. You may need to have more tests or see a specialist.  Call 911  Call 911 if any of these occur:    Trouble breathing    Stiff, rigid abdomen that is severely painful to touch    Confusion    Fainting or loss of consciousness    Rapid heart rate    Chest pain  When to seek medical advice  Call your healthcare provider right away if any of these occur:    Fever over 100.4 F (38 C)    Failure to resume normal bowel movements    Pain in your abdomen or back gets worse    Nausea or vomiting    Swelling in your abdomen    Blood in the stool    Black, tarry stool    Involuntary weight loss    Weakness    0482-7881 [employer]. [address] [place] [address], [place] [address]. All rights  reserved. This information is not intended as a substitute for professional medical care. Always follow your healthcare professional's instructions.

## 2017-05-12 ENCOUNTER — ANESTHESIA (OUTPATIENT)
Dept: SURGERY | Facility: CLINIC | Age: 57
End: 2017-05-12
Payer: COMMERCIAL

## 2017-05-12 ENCOUNTER — HOSPITAL ENCOUNTER (OUTPATIENT)
Facility: CLINIC | Age: 57
Discharge: HOME | End: 2017-05-12
Attending: COLON & RECTAL SURGERY | Admitting: COLON & RECTAL SURGERY
Payer: COMMERCIAL

## 2017-05-12 ENCOUNTER — ANESTHESIA EVENT (OUTPATIENT)
Dept: SURGERY | Facility: CLINIC | Age: 57
End: 2017-05-12
Payer: COMMERCIAL

## 2017-05-12 VITALS
RESPIRATION RATE: 18 BRPM | OXYGEN SATURATION: 97 % | SYSTOLIC BLOOD PRESSURE: 117 MMHG | TEMPERATURE: 96.8 F | BODY MASS INDEX: 28.23 KG/M2 | DIASTOLIC BLOOD PRESSURE: 75 MMHG | WEIGHT: 208.4 LBS | HEIGHT: 72 IN

## 2017-05-12 DIAGNOSIS — R33.9 URINARY RETENTION: ICD-10-CM

## 2017-05-12 DIAGNOSIS — G89.18 ACUTE POST-OPERATIVE PAIN: Primary | ICD-10-CM

## 2017-05-12 DIAGNOSIS — Z98.890 S/P HEMORRHOIDECTOMY: ICD-10-CM

## 2017-05-12 DIAGNOSIS — Z87.19 S/P HEMORRHOIDECTOMY: ICD-10-CM

## 2017-05-12 LAB
ALBUMIN UR-MCNC: 30 MG/DL
APPEARANCE UR: CLEAR
BASOPHILS # BLD AUTO: 0 10E9/L (ref 0–0.2)
BASOPHILS NFR BLD AUTO: 0.4 %
BILIRUB UR QL STRIP: NEGATIVE
COLOR UR AUTO: YELLOW
DIFFERENTIAL METHOD BLD: ABNORMAL
EOSINOPHIL # BLD AUTO: 0.3 10E9/L (ref 0–0.7)
EOSINOPHIL NFR BLD AUTO: 3.7 %
ERYTHROCYTE [DISTWIDTH] IN BLOOD BY AUTOMATED COUNT: 20.6 % (ref 10–15)
GLUCOSE UR STRIP-MCNC: NEGATIVE MG/DL
HCT VFR BLD AUTO: 29 % (ref 40–53)
HGB BLD-MCNC: 8.7 G/DL (ref 13.3–17.7)
HGB UR QL STRIP: NEGATIVE
HYALINE CASTS #/AREA URNS LPF: 1 /LPF (ref 0–2)
IMM GRANULOCYTES # BLD: 0 10E9/L (ref 0–0.4)
IMM GRANULOCYTES NFR BLD: 0.3 %
KETONES UR STRIP-MCNC: 10 MG/DL
LEUKOCYTE ESTERASE UR QL STRIP: NEGATIVE
LYMPHOCYTES # BLD AUTO: 0.8 10E9/L (ref 0.8–5.3)
LYMPHOCYTES NFR BLD AUTO: 11.9 %
MCH RBC QN AUTO: 20 PG (ref 26.5–33)
MCHC RBC AUTO-ENTMCNC: 30 G/DL (ref 31.5–36.5)
MCV RBC AUTO: 67 FL (ref 78–100)
MONOCYTES # BLD AUTO: 0.7 10E9/L (ref 0–1.3)
MONOCYTES NFR BLD AUTO: 11 %
MUCOUS THREADS #/AREA URNS LPF: PRESENT /LPF
NEUTROPHILS # BLD AUTO: 4.9 10E9/L (ref 1.6–8.3)
NEUTROPHILS NFR BLD AUTO: 72.7 %
NITRATE UR QL: NEGATIVE
NRBC # BLD AUTO: 0 10*3/UL
NRBC BLD AUTO-RTO: 0 /100
PH UR STRIP: 5.5 PH (ref 5–7)
PLATELET # BLD AUTO: 309 10E9/L (ref 150–450)
RBC # BLD AUTO: 4.36 10E12/L (ref 4.4–5.9)
RBC #/AREA URNS AUTO: 1 /HPF (ref 0–2)
SP GR UR STRIP: 1.03 (ref 1–1.03)
URN SPEC COLLECT METH UR: ABNORMAL
UROBILINOGEN UR STRIP-MCNC: NORMAL MG/DL (ref 0–2)
WBC # BLD AUTO: 6.7 10E9/L (ref 4–11)
WBC #/AREA URNS AUTO: <1 /HPF (ref 0–2)

## 2017-05-12 PROCEDURE — 25000128 H RX IP 250 OP 636: Performed by: ANESTHESIOLOGY

## 2017-05-12 PROCEDURE — S0020 INJECTION, BUPIVICAINE HYDRO: HCPCS | Performed by: COLON & RECTAL SURGERY

## 2017-05-12 PROCEDURE — 37000009 ZZH ANESTHESIA TECHNICAL FEE, EACH ADDTL 15 MIN: Performed by: COLON & RECTAL SURGERY

## 2017-05-12 PROCEDURE — 71000012 ZZH RECOVERY PHASE 1 LEVEL 1 FIRST HR: Performed by: COLON & RECTAL SURGERY

## 2017-05-12 PROCEDURE — 25000128 H RX IP 250 OP 636: Performed by: NURSE ANESTHETIST, CERTIFIED REGISTERED

## 2017-05-12 PROCEDURE — 85025 COMPLETE CBC W/AUTO DIFF WBC: CPT | Performed by: COLON & RECTAL SURGERY

## 2017-05-12 PROCEDURE — 25000566 ZZH SEVOFLURANE, EA 15 MIN: Performed by: COLON & RECTAL SURGERY

## 2017-05-12 PROCEDURE — 27210794 ZZH OR GENERAL SUPPLY STERILE: Performed by: COLON & RECTAL SURGERY

## 2017-05-12 PROCEDURE — 37000008 ZZH ANESTHESIA TECHNICAL FEE, 1ST 30 MIN: Performed by: COLON & RECTAL SURGERY

## 2017-05-12 PROCEDURE — 81001 URINALYSIS AUTO W/SCOPE: CPT | Performed by: COLON & RECTAL SURGERY

## 2017-05-12 PROCEDURE — 25000125 ZZHC RX 250: Performed by: NURSE ANESTHETIST, CERTIFIED REGISTERED

## 2017-05-12 PROCEDURE — 25000125 ZZHC RX 250: Performed by: COLON & RECTAL SURGERY

## 2017-05-12 PROCEDURE — 40000170 ZZH STATISTIC PRE-PROCEDURE ASSESSMENT II: Performed by: COLON & RECTAL SURGERY

## 2017-05-12 PROCEDURE — 36415 COLL VENOUS BLD VENIPUNCTURE: CPT | Performed by: COLON & RECTAL SURGERY

## 2017-05-12 PROCEDURE — 36000044 ZZH SURGERY LEVEL 1 1ST 30 MIN: Performed by: COLON & RECTAL SURGERY

## 2017-05-12 PROCEDURE — 87086 URINE CULTURE/COLONY COUNT: CPT | Performed by: COLON & RECTAL SURGERY

## 2017-05-12 PROCEDURE — 25000128 H RX IP 250 OP 636: Performed by: COLON & RECTAL SURGERY

## 2017-05-12 PROCEDURE — 25800025 ZZH RX 258: Performed by: ANESTHESIOLOGY

## 2017-05-12 PROCEDURE — 71000027 ZZH RECOVERY PHASE 2 EACH 15 MINS: Performed by: COLON & RECTAL SURGERY

## 2017-05-12 PROCEDURE — 71000013 ZZH RECOVERY PHASE 1 LEVEL 1 EA ADDTL HR: Performed by: COLON & RECTAL SURGERY

## 2017-05-12 PROCEDURE — 25000125 ZZHC RX 250: Performed by: ANESTHESIOLOGY

## 2017-05-12 PROCEDURE — 36000046 ZZH SURGERY LEVEL 1 EA 15 ADDTL MIN: Performed by: COLON & RECTAL SURGERY

## 2017-05-12 RX ORDER — FENTANYL CITRATE 50 UG/ML
INJECTION, SOLUTION INTRAMUSCULAR; INTRAVENOUS PRN
Status: DISCONTINUED | OUTPATIENT
Start: 2017-05-12 | End: 2017-05-12

## 2017-05-12 RX ORDER — IBUPROFEN 600 MG/1
600 TABLET, FILM COATED ORAL EVERY 6 HOURS PRN
Qty: 30 TABLET | Refills: 0 | COMMUNITY
Start: 2017-05-12

## 2017-05-12 RX ORDER — LIDOCAINE HYDROCHLORIDE 20 MG/ML
INJECTION, SOLUTION INFILTRATION; PERINEURAL PRN
Status: DISCONTINUED | OUTPATIENT
Start: 2017-05-12 | End: 2017-05-12

## 2017-05-12 RX ORDER — TAMSULOSIN HYDROCHLORIDE 0.4 MG/1
0.4 CAPSULE ORAL DAILY
Qty: 30 CAPSULE | Refills: 1 | Status: SHIPPED | OUTPATIENT
Start: 2017-05-12

## 2017-05-12 RX ORDER — FENTANYL CITRATE 50 UG/ML
25-50 INJECTION, SOLUTION INTRAMUSCULAR; INTRAVENOUS
Status: DISCONTINUED | OUTPATIENT
Start: 2017-05-12 | End: 2017-05-12 | Stop reason: HOSPADM

## 2017-05-12 RX ORDER — MEPERIDINE HYDROCHLORIDE 25 MG/ML
12.5 INJECTION INTRAMUSCULAR; INTRAVENOUS; SUBCUTANEOUS
Status: DISCONTINUED | OUTPATIENT
Start: 2017-05-12 | End: 2017-05-12 | Stop reason: HOSPADM

## 2017-05-12 RX ORDER — PROPOFOL 10 MG/ML
INJECTION, EMULSION INTRAVENOUS CONTINUOUS PRN
Status: DISCONTINUED | OUTPATIENT
Start: 2017-05-12 | End: 2017-05-12

## 2017-05-12 RX ORDER — NALOXONE HYDROCHLORIDE 0.4 MG/ML
.1-.4 INJECTION, SOLUTION INTRAMUSCULAR; INTRAVENOUS; SUBCUTANEOUS
Status: DISCONTINUED | OUTPATIENT
Start: 2017-05-12 | End: 2017-05-12 | Stop reason: HOSPADM

## 2017-05-12 RX ORDER — EPHEDRINE SULFATE 50 MG/ML
INJECTION, SOLUTION INTRAMUSCULAR; INTRAVENOUS; SUBCUTANEOUS PRN
Status: DISCONTINUED | OUTPATIENT
Start: 2017-05-12 | End: 2017-05-12

## 2017-05-12 RX ORDER — LIDOCAINE HYDROCHLORIDE AND EPINEPHRINE 10; 10 MG/ML; UG/ML
INJECTION, SOLUTION INFILTRATION; PERINEURAL
Status: DISCONTINUED
Start: 2017-05-12 | End: 2017-05-12 | Stop reason: HOSPADM

## 2017-05-12 RX ORDER — CIPROFLOXACIN 2 MG/ML
400 INJECTION, SOLUTION INTRAVENOUS
Status: COMPLETED | OUTPATIENT
Start: 2017-05-12 | End: 2017-05-12

## 2017-05-12 RX ORDER — HYDROCODONE BITARTRATE AND ACETAMINOPHEN 5; 325 MG/1; MG/1
1-2 TABLET ORAL
Status: CANCELLED | OUTPATIENT
Start: 2017-05-12

## 2017-05-12 RX ORDER — BUPIVACAINE HYDROCHLORIDE 5 MG/ML
INJECTION, SOLUTION EPIDURAL; INTRACAUDAL
Status: DISCONTINUED
Start: 2017-05-12 | End: 2017-05-12 | Stop reason: HOSPADM

## 2017-05-12 RX ORDER — FENTANYL CITRATE 50 UG/ML
50-100 INJECTION, SOLUTION INTRAMUSCULAR; INTRAVENOUS
Status: DISCONTINUED | OUTPATIENT
Start: 2017-05-12 | End: 2017-05-12 | Stop reason: HOSPADM

## 2017-05-12 RX ORDER — ONDANSETRON 4 MG/1
4 TABLET, ORALLY DISINTEGRATING ORAL EVERY 30 MIN PRN
Status: DISCONTINUED | OUTPATIENT
Start: 2017-05-12 | End: 2017-05-12 | Stop reason: HOSPADM

## 2017-05-12 RX ORDER — CIPROFLOXACIN 2 MG/ML
400 INJECTION, SOLUTION INTRAVENOUS SEE ADMIN INSTRUCTIONS
Status: DISCONTINUED | OUTPATIENT
Start: 2017-05-12 | End: 2017-05-12 | Stop reason: HOSPADM

## 2017-05-12 RX ORDER — PROPOFOL 10 MG/ML
INJECTION, EMULSION INTRAVENOUS PRN
Status: DISCONTINUED | OUTPATIENT
Start: 2017-05-12 | End: 2017-05-12

## 2017-05-12 RX ORDER — ONDANSETRON 2 MG/ML
4 INJECTION INTRAMUSCULAR; INTRAVENOUS EVERY 30 MIN PRN
Status: DISCONTINUED | OUTPATIENT
Start: 2017-05-12 | End: 2017-05-12 | Stop reason: HOSPADM

## 2017-05-12 RX ORDER — POLYETHYLENE GLYCOL 3350 17 G/17G
1 POWDER, FOR SOLUTION ORAL 2 TIMES DAILY
Qty: 476 G | Refills: 0 | Status: SHIPPED | OUTPATIENT
Start: 2017-05-12 | End: 2017-05-26

## 2017-05-12 RX ORDER — SODIUM CHLORIDE, SODIUM LACTATE, POTASSIUM CHLORIDE, CALCIUM CHLORIDE 600; 310; 30; 20 MG/100ML; MG/100ML; MG/100ML; MG/100ML
INJECTION, SOLUTION INTRAVENOUS CONTINUOUS
Status: DISCONTINUED | OUTPATIENT
Start: 2017-05-12 | End: 2017-05-12 | Stop reason: HOSPADM

## 2017-05-12 RX ORDER — HYDROCODONE BITARTRATE AND ACETAMINOPHEN 7.5; 325 MG/1; MG/1
TABLET ORAL
Qty: 60 TABLET | Refills: 0 | Status: SHIPPED | OUTPATIENT
Start: 2017-05-12

## 2017-05-12 RX ORDER — HYDROMORPHONE HYDROCHLORIDE 1 MG/ML
.3-.5 INJECTION, SOLUTION INTRAMUSCULAR; INTRAVENOUS; SUBCUTANEOUS EVERY 10 MIN PRN
Status: DISCONTINUED | OUTPATIENT
Start: 2017-05-12 | End: 2017-05-12 | Stop reason: HOSPADM

## 2017-05-12 RX ADMIN — CIPROFLOXACIN 400 MG: 2 INJECTION INTRAVENOUS at 11:27

## 2017-05-12 RX ADMIN — FENTANYL CITRATE 100 MCG: 50 INJECTION, SOLUTION INTRAMUSCULAR; INTRAVENOUS at 11:14

## 2017-05-12 RX ADMIN — PHENYLEPHRINE HYDROCHLORIDE 100 MCG: 10 INJECTION, SOLUTION INTRAMUSCULAR; INTRAVENOUS; SUBCUTANEOUS at 11:18

## 2017-05-12 RX ADMIN — ONDANSETRON 4 MG: 2 SOLUTION INTRAMUSCULAR; INTRAVENOUS at 12:30

## 2017-05-12 RX ADMIN — Medication 5 MG: at 11:36

## 2017-05-12 RX ADMIN — FENTANYL CITRATE 100 MCG: 50 INJECTION, SOLUTION INTRAMUSCULAR; INTRAVENOUS at 10:45

## 2017-05-12 RX ADMIN — SODIUM CHLORIDE, POTASSIUM CHLORIDE, SODIUM LACTATE AND CALCIUM CHLORIDE: 600; 310; 30; 20 INJECTION, SOLUTION INTRAVENOUS at 10:47

## 2017-05-12 RX ADMIN — PROCHLORPERAZINE EDISYLATE 5 MG: 5 INJECTION INTRAMUSCULAR; INTRAVENOUS at 12:55

## 2017-05-12 RX ADMIN — PHENYLEPHRINE HYDROCHLORIDE 100 MCG: 10 INJECTION, SOLUTION INTRAMUSCULAR; INTRAVENOUS; SUBCUTANEOUS at 11:30

## 2017-05-12 RX ADMIN — PROPOFOL 200 MCG/KG/MIN: 10 INJECTION, EMULSION INTRAVENOUS at 11:24

## 2017-05-12 RX ADMIN — PHENYLEPHRINE HYDROCHLORIDE 100 MCG: 10 INJECTION, SOLUTION INTRAMUSCULAR; INTRAVENOUS; SUBCUTANEOUS at 11:33

## 2017-05-12 RX ADMIN — MIDAZOLAM HYDROCHLORIDE 1 MG: 1 INJECTION, SOLUTION INTRAMUSCULAR; INTRAVENOUS at 11:29

## 2017-05-12 RX ADMIN — MIDAZOLAM HYDROCHLORIDE 1 MG: 1 INJECTION, SOLUTION INTRAMUSCULAR; INTRAVENOUS at 11:10

## 2017-05-12 RX ADMIN — LIDOCAINE HYDROCHLORIDE 100 MG: 20 INJECTION, SOLUTION INFILTRATION; PERINEURAL at 11:14

## 2017-05-12 RX ADMIN — Medication 5 MG: at 11:18

## 2017-05-12 RX ADMIN — PROPOFOL 200 MG: 10 INJECTION, EMULSION INTRAVENOUS at 11:14

## 2017-05-12 ASSESSMENT — COPD QUESTIONNAIRES: COPD: 0

## 2017-05-12 ASSESSMENT — LIFESTYLE VARIABLES: TOBACCO_USE: 0

## 2017-05-12 NOTE — ANESTHESIA PREPROCEDURE EVALUATION
Anesthesia Evaluation     . Pt has had prior anesthetic. Type: General    History of anesthetic complications          ROS/MED HX    ENT/Pulmonary:     (+)asthma , . .   (-) tobacco use, COPD and sleep apnea   Neurologic:       Cardiovascular:        (-) hypertension and CAD   METS/Exercise Tolerance:     Hematologic:     (+) Anemia, -      Musculoskeletal:         GI/Hepatic:     (+) GERD      (-) liver disease   Renal/Genitourinary:      (-) renal disease   Endo:     (+) thyroid problem hypothyroidism, .      Psychiatric:         Infectious Disease:         Malignancy:         Other:                     Physical Exam  Normal systems: cardiovascular, pulmonary and dental    Airway   Mallampati: I  TM distance: >3 FB  Neck ROM: full    Dental     Cardiovascular       Pulmonary                     Anesthesia Plan      History & Physical Review  History and physical reviewed and following examination; no interval change.    ASA Status:  2 .    NPO Status:  > 8 hours    Plan for General and ETT with Intravenous induction. Maintenance will be TIVA.    PONV prophylaxis:  Ondansetron (or other 5HT-3) and Dexamethasone or Solumedrol       Postoperative Care  Postoperative pain management:  Multi-modal analgesia.      Consents  Anesthetic plan, risks, benefits and alternatives discussed with:  Patient..                          .

## 2017-05-12 NOTE — BRIEF OP NOTE
[facility] Brief Operative Note    Pre-operative diagnosis: Anal pain and fever s/p hemorrhoidectomy   Post-operative diagnosis Anal pain and fever s/p hemorrhoidectomy   Procedure: Exam under anesthesia   Surgeon(s): Surgeon(s) and Role:     * Gage Martinez MD - Primary   Estimated blood loss: 3 mL    Specimens:   ID Type Source Tests Collected by Time Destination   1 : urine for culture Urine Urine catheter URINE CULTURE AEROBIC BACTERIAL, ROUTINE UA WITH MICROSCOPIC Gage Martinez MD 5/12/2017 11:15 AM       Findings: Normal appearing hemorrhoidectomy excision sites in right posterior and right anterior positions. Slight friability of right anterior pedicle reinforced with 2-0 Vicryl.

## 2017-05-12 NOTE — ANESTHESIA CARE TRANSFER NOTE
Patient: Aman Tarango    Procedure(s):  EXAM UNDER ANESTHESIA - Wound Class: II-Clean Contaminated    Diagnosis: INTERNAL HEMORRHOIDS WITH COMPLICATIONS  Diagnosis Additional Information: No value filed.    Anesthesia Type:   General, ETT     Note:  Airway :Face Mask  Patient transferred to:PACU  Comments: Neuromuscular blockade reversed, TOF 4/4 with head lift sustained for 5 seconds, spontaneous respirations, followed commands, oropharynx suctioned with soft flexible catheter, extubated atraumatically with suction. Airway patent after extubation. Oxygen via FM at 8 LPM to PACU, connected to wall O2 in PACU. All monitors and alarms on and functioning. Report given to PACU RN and questions answered.       Vitals: (Last set prior to Anesthesia Care Transfer)    CRNA VITALS  5/12/2017 1134 - 5/12/2017 1204      5/12/2017             Resp Rate (set): 10                Electronically Signed By: ELIJAH Florez CRNA  May 12, 2017  12:04 PM

## 2017-05-12 NOTE — IP AVS SNAPSHOT
[facility]    [address]    [place] [address]    Phone:  [phone]    Fax:  [phone]                                       After Visit Summary   5/12/2017    Aman Tarango    MRN: [id number]           After Visit Summary Signature Page     I have received my discharge instructions, and my questions have been answered. I have discussed any challenges I see with this plan with the nurse or doctor.    ..........................................................................................................................................  Patient/Patient Representative Signature      ..........................................................................................................................................  Patient Representative Print Name and Relationship to Patient    ..................................................               ................................................  Date                                            Time    ..........................................................................................................................................  Reviewed by Signature/Title    ...................................................              ..............................................  Date                                                            Time

## 2017-05-12 NOTE — OR NURSING
"Asthma is seasonal, spring season, had prednisone dose pack was 2 weeks ago completed.  \"doing well since then\"     Rectal pain is \"8\", states he has had reddish brown rectal drainage constantly since surgery.  Has has several very minimal bowel movements since surgery.    Has had difficulty voiding. Went to ER on 05/9/17 because unable to void, was cathed and \"they tore something\".  States he has had burning with voiding since then and hard to void.  On admission today 0930, states he has been unable to void since last night.  Does not feel like he can empty his bladder.  Bladder scan done with 135cc noted.  Dr. Martinez called with update and is aware we are unable to get ordered urine culture.  Patient feels urine maybe a little cloudy.     Denies bladder pain, complains of rectal pain at present.    1045 Fentanyl given for rectal pain of \"8\".    1055 Pain decreased to\" 2-3\"  "

## 2017-05-12 NOTE — OR NURSING
Pt and girlfriend state they did get mckeon cath cares and how to d/c in PACU- verbalized understanding, denies questions. AOX3-VSS-o2 sats >94% RA- good po intake, denies c/o. Pt and girlfriend verblize understanding of discharge instructions, denies questions. Up to W/C- transported to door for discharge to home

## 2017-05-12 NOTE — IP AVS SNAPSHOT
MRN:[id number]                      After Visit Summary   5/12/2017    Aman Tarango    MRN: [id number]           Thank you!     Thank you for choosing [facility] for your care. Our goal is always to provide you with excellent care. Hearing back from our patients is one way we can continue to improve our services. Please take a few minutes to complete the written survey that you may receive in the mail after you visit with us. Thank you!        Patient Information     Date Of Birth          1960        About your hospital stay     You were admitted on:  May 12, 2017 [facility]    You were discharged on:  May 12, 2017       Who to Call     For medical emergencies, please call 911.  For non-urgent questions about your medical care, please call your primary care provider or clinic, [phone]  For questions related to your surgery, please call your surgery clinic        Attending Provider     Provider Specialty    Gage Martinez MD [facility] Rect[facility] Surgery       Primary Care Provider Office Phone # Fax #    Ash Jeremias Boucher [phone]       [address]  [facility] [address]        After Care Instructions     Discharge Instructions       Keep your appointment with Dr. Martinez next week.  Call the office if you develop pain that is not controlled with pain medication, bleeding that does stop, fever, or constipation.    Do warm baths several times daily as needed for pain control.   If no bowel movement in 48 hours, take milk of magnesia 30 cc po.  Resume activities as tolerated.   No driving while taking narcotics.            Discharge Instructions       Give patient mckeon leg bag and teach pt how to take mckeon out on POD #5 (Wednesday of next week).            Dressing       May remove dressing in 1 hours. Keep dry gauze or pad in place as needed to catch any drainage. Pt will have  reddish/brownish drainage for at least 4-6 weeks post-operatively.  This is normal.                  Further instructions from your care team       Same Day Surgery Discharge Instructions for  Sedation and General Anesthesia       It's not unusual to feel dizzy, light-headed or faint for up to 24 hours after surgery or while taking pain medication.  If you have these symptoms: sit for a few minutes before standing and have someone assist you when you get up to walk or use the bathroom.      You should rest and relax for the next 24 hours. We recommend you make arrangements to have an adult stay with you for at least 24 hours after your discharge.  Avoid hazardous and strenuous activity.      DO NOT DRIVE any vehicle or operate mechanical equipment for 24 hours following the end of your surgery.  Even though you may feel normal, your reactions may be affected by the medication you have received.      Do not drink alcoholic beverages for 24 hours following surgery.       Slowly progress to your regular diet as you feel able. It's not unusual to feel nauseated and/or vomit after receiving anesthesia.  If you develop these symptoms, drink clear liquids (apple juice, ginger ale, broth, 7-up, etc. ) until you feel better.  If your nausea and vomiting persists for 24 hours, please notify your surgeon.        All narcotic pain medications, along with inactivity and anesthesia, can cause constipation. Drinking plenty of liquids and increasing fiber intake will help.      For any questions of a medical nature, call your surgeon.      Do not make important decisions for 24 hours.      If you had general anesthesia, you may have a sore throat for a couple of days related to the breathing tube used during surgery.  You may use Cepacol lozenges to help with this discomfort.  If it worsens or if you develop a fever, contact your surgeon.       If you feel your pain is not well managed with the pain medications prescribed by your  surgeon, please contact your surgeon's office to let them know so they can address your concerns.       DISCHARGE INSTRUCTIONS FOR   CATHETER CARE AT HOME      .      Basic Catheter Care  1. Always wash hands before and after handling your catheter.  2. Use soap and water to wash the area around your catheter.  3. Do this procedure twice a day.  4. Proper cleansing will help keep the area from becoming irritated or infected.    Leg Bag  This is a small plastic bag that collects urine draining from your catheter and then strapped around your thigh. It will need to be emptied when the bag is 1/2 to 3/4 full.     Large Drainage Bag  1. This bag is larger than the leg bag and holds more urine.  It is to be used while at home, especially at night.    2. Before you go to bed, change the leg bag to the large drainage bag.  3. Pinch off the catheter with your fingers and swab the connection between the catheter and leg bag with alcohol sponge.  4. Disconnect the leg bag and connect the large drainage bag to your catheter.  5. When you get into bed, arrange the drainage tubing so that it doesn t kink.  6. Be sure to keep the bag below the level of your bladder and allow enough slack for turning.    Cleaning Your Drainage Bags    1. Wash hands.  2. Using funnel or syringe, fill the bag half full with a solution of 1/2  vinegar and 1/2 water.  3. Shake bag, allowing mixture to cleanse inside of bag.  4. Empty out all vinegar and water mixture from your bag.  5. Hang bag to dry when not in use.  6. Clean your bags anytime you change them.      Helpful Hints  1. Always keep drainage bags below bladder level to insure adequate drainage.  2. Drink 4-6 glasses of water daily along with other fluids you normally drink to keep urine free of infection and / or clots.  3. If you notice no urine in your bag for 2 to 4 hours or you develop extreme discomfort in bladder area, your catheter maybe plugged.  Notify your doctor.  4. If you  notice your urine becomes foul smelling and cloudy, notify your doctor.  Also notify your doctor if you develop fever or chills.  5. If you notice urine leaking around the outside of the catheter, check to be sure catheter or tubing is not kinked.  6. Don t use leg bag while in bed.          HOW TO REMOVE YOUR CATHETER INSTRUCTIONS    1. Wash your hands.    2. Insert the syringe into balloon port of the catheter.    3. Withdraw all the fluid from the balloon.  You may have to re-insert the syringe into the port additional times until no more fluid can be withdrawn.    4. Pull gently on the catheter.  If no resistance, slowly withdraw.    5. Dispose of the catheter and the syringe.    6. Wash your hands.    7. Call your doctor if unable to urinate within 6-8 hours, or when uncomfortable.     Reasons to contact your surgeon:    1. Signs of possible infection: Check your incision daily for redness, swelling, warmth, red streaks or foul drainage.   2. Elevated temperature.  3. Pain not controlled with pain medication and/or rest.   4. Uncontrolled nausea or vomiting.  5. Any questions or concerns.        Discharge Instructions Following Anorectal Surgery  [facility]  [phone]  Medication:     You may be prescribed a narcotic pain relievers such as: Vicodin, Percocet, and Tylenol # 3.  You should reduce your use of these medications as your pain improves.  You may be prescribed an anal ointment (such as Americain, Tronothane, or Xylocaine). Apply the ointment to the anal area with your finger, then cover the area with cotton or gauze.  Bulk forming stool softeners (Konsyl-DR, Metamucil, or Citrucel) are to be taken in a glass of water once in the morning with increased water intake throughout the day.   Bowel function:   Bowel movements can be painful. It is important to have regular bowel movements at least every other day and to keep your stool soft. Constipation and/or diarrhea can make the  pain worse and must be avoided.   Constipation:  You should have a bowel movement at least every other day.  If two days pass without one, take one tablespoon of milk of magnesia; if there is no result, repeat this dose in six hours. You may also use an over-the-counter phosphate enema or tap water enema.   Diarrhea:  Diarrhea, usually caused by the overuse of laxatives, is also a concern. If you have more than three watery bowel movements during a 24 hour period, stop taking milk of magnesia or laxatives but continue taking the bulk forming agents.  If diarrhea persists, call your doctor.   Bathing:  After bowel movements, use a wet washcloth, toilet paper or cotton to clean yourself.  If possible take a sitz bath or tub bath immediately. Baths should last between 10-15 minutes with the water as warm as you can comfortably tolerate. Try to take at least three sitz baths (or showers with hand-held sprayer) every day.   Discharge/Infection:  Bloody discharge after bowel movements is normal and may last 2 to 4 weeks after your surgery. However, if you have bleeding between bowel movements that doesn't stop, call the office.  Urination:  If you have trouble urinating, do so while sitting in a tub of water, or run the water faucet while sitting on the toilet. If you are unable to urinate 6-8 hours after surgery, call the office.  Diet:  A high fiber diet, including at least four servings of fruits or vegetables daily, will help to keep your bowel movements regular and soft. It is important to drink six to eight glasses of water or juice everyday when using fiber products.   Activity:    Activity as tolerated. After some surgeries, you may be told not to perform any lifting (more than 10 pounds) for several weeks after surgery.    Call the office if you have:  Fever greater than 101   Chills   Foul-smelling drainage   Nausea and vomiting   Diarrhea - greater than 3 water stools in 24 hours   Constipation - no bowel  "movement for 3 days   Severe bleeding that does not stop soon after a bowel movement   Problems with the incision, including increased pain, swelling, redness, or drainage.  Difficulty urinating    **If you have questions or concerns about your procedure, call   Dr. Martinez at [phone]**                    Pending Results     Date and Time Order Name Status Description    2017 1115 Urine Culture Aerobic Bacterial Preliminary             Admission Information     Date & Time Provider Department Dept. Phone    2017 Gage Martinez MD [facility] Same Day Surgery [phone]      Your Vitals Were     Blood Pressure Temperature Respirations Height Weight Pulse Oximetry    124/75 96.8  F (36  C) (Temporal) 16 1.829 m (6') 94.5 kg (208 lb 6.4 oz) 95%    BMI (Body Mass Index)                   28.26 kg/m2           [employer] Information     [employer] lets you send messages to your doctor, view your test results, renew your prescriptions, schedule appointments and more. To sign up, go to [email or web address] . Click on \"Log in\" on the left side of the screen, which will take you to the Welcome page. Then click on \"Sign up Now\" on the right side of the page.     You will be asked to enter the access code listed below, as well as some personal information. Please follow the directions to create your username and password.     Your access code is: [email or web address]  Expires: 2017  6:49 PM     Your access code will  in 90 days. If you need help or a new code, please call your [facility] clinic or [phone].        Care EveryWhere ID     This is your Care EveryWhere ID. This could be used by other organizations to access your [facility] medical records  [id number]           Review of your medicines      START taking        Dose / Directions    HYDROcodone-acetaminophen 7.5-325 MG per tablet   Commonly known as:  NORCO   Used for:  Acute post-operative pain        1-2 tablets po Q4-6 hrs prn pain. " Maximum 12 tablet(s) per day.   Quantity:  60 tablet   Refills:  0       ibuprofen 600 MG tablet   Commonly known as:  ADVIL/MOTRIN   Used for:  Acute post-operative pain        Dose:  600 mg   Take 1 tablet (600 mg) by mouth every 6 hours as needed for pain (mild)   Quantity:  30 tablet   Refills:  0       polyethylene glycol powder   Commonly known as:  MIRALAX   Used for:  S/P hemorrhoidectomy        Dose:  1 capful   Take 17 g (1 capful) by mouth 2 times daily for 14 days   Quantity:  476 g   Refills:  0       tamsulosin 0.4 MG capsule   Commonly known as:  FLOMAX   Used for:  Urinary retention        Dose:  0.4 mg   Take 1 capsule (0.4 mg) by mouth daily   Quantity:  30 capsule   Refills:  1         CONTINUE these medicines which have NOT CHANGED        Dose / Directions    albuterol 108 (90 BASE) MCG/ACT Inhaler   Commonly known as:  PROAIR HFA/PROVENTIL HFA/VENTOLIN HFA        Dose:  1-2 puff   Inhale 1-2 puffs into the lungs every 4 hours as needed for shortness of breath / dyspnea or wheezing   Refills:  0       cetirizine 10 MG tablet   Commonly known as:  zyrTEC        Take by mouth daily   Refills:  0       diazepam 5 MG tablet   Commonly known as:  VALIUM   Used for:  Anal pain        Dose:  5 mg   Take 1 tablet (5 mg) by mouth every 8 hours as needed for muscle spasms or pain (as needed for rectal/anal spasm)   Quantity:  20 tablet   Refills:  0       fluticasone-salmeterol 250-50 MCG/DOSE diskus inhaler   Commonly known as:  ADVAIR        Dose:  1 puff   Inhale 1 puff into the lungs every 12 hours as needed   Refills:  0       priLOSEC OTC 20 MG tablet   Generic drug:  omeprazole        Take  by mouth daily.   Refills:  0       PRISTIQ 50 MG 24 hr tablet   Generic drug:  desvenlafaxine succinate        Dose:  50 mg   Take 50 mg by mouth daily   Refills:  0       SYNTHROID PO        Dose:  150 mcg   Take 150 mcg by mouth daily   Refills:  0         STOP taking     oxyCODONE-acetaminophen 5-325 MG per  tablet   Commonly known as:  PERCOCET                Where to get your medicines      These medications were sent to [facility] [place], [place] - [address]  [address] [place] [address]     Phone:  [phone]     polyethylene glycol powder    tamsulosin 0.4 MG capsule         Some of these will need a paper prescription and others can be bought over the counter. Ask your nurse if you have questions.     Bring a paper prescription for each of these medications     HYDROcodone-acetaminophen 7.5-325 MG per tablet       You don't need a prescription for these medications     ibuprofen 600 MG tablet                Protect others around you: Learn how to safely use, store and throw away your medicines at www.disposemymeds.org.             Medication List: This is a list of all your medications and when to take them. Check marks below indicate your daily home schedule. Keep this list as a reference.      Medications           Morning Afternoon Evening Bedtime As Needed    albuterol 108 (90 BASE) MCG/ACT Inhaler   Commonly known as:  PROAIR HFA/PROVENTIL HFA/VENTOLIN HFA   Inhale 1-2 puffs into the lungs every 4 hours as needed for shortness of breath / dyspnea or wheezing                                cetirizine 10 MG tablet   Commonly known as:  zyrTEC   Take by mouth daily                                diazepam 5 MG tablet   Commonly known as:  VALIUM   Take 1 tablet (5 mg) by mouth every 8 hours as needed for muscle spasms or pain (as needed for rectal/anal spasm)                                fluticasone-salmeterol 250-50 MCG/DOSE diskus inhaler   Commonly known as:  ADVAIR   Inhale 1 puff into the lungs every 12 hours as needed                                HYDROcodone-acetaminophen 7.5-325 MG per tablet   Commonly known as:  NORCO   1-2 tablets po Q4-6 hrs prn pain. Maximum 12 tablet(s) per day.                                ibuprofen 600 MG tablet   Commonly known as:   ADVIL/MOTRIN   Take 1 tablet (600 mg) by mouth every 6 hours as needed for pain (mild)                                polyethylene glycol powder   Commonly known as:  MIRALAX   Take 17 g (1 capful) by mouth 2 times daily for 14 days                                priLOSEC OTC 20 MG tablet   Take  by mouth daily.   Generic drug:  omeprazole                                PRISTIQ 50 MG 24 hr tablet   Take 50 mg by mouth daily   Generic drug:  desvenlafaxine succinate                                SYNTHROID PO   Take 150 mcg by mouth daily                                tamsulosin 0.4 MG capsule   Commonly known as:  FLOMAX   Take 1 capsule (0.4 mg) by mouth daily

## 2017-05-12 NOTE — ANESTHESIA POSTPROCEDURE EVALUATION
Patient: Aman Tarango    Procedure(s):  EXAM UNDER ANESTHESIA - Wound Class: II-Clean Contaminated    Diagnosis:INTERNAL HEMORRHOIDS WITH COMPLICATIONS  Diagnosis Additional Information: No value filed.    Anesthesia Type:  General, ETT    Note:  Anesthesia Post Evaluation    Patient location during evaluation: PACU  Patient participation: Able to fully participate in evaluation  Level of consciousness: awake  Pain management: adequate  Airway patency: patent  Cardiovascular status: acceptable  Respiratory status: acceptable  Hydration status: acceptable  PONV: none     Anesthetic complications: None          Last vitals:  Vitals:    05/12/17 1245 05/12/17 1300 05/12/17 1315   BP: 132/86 119/71 124/75   Resp: 16 16 16   Temp:      SpO2: 99% 100% 95%         Electronically Signed By: Bernardo Burt MD  May 12, 2017  2:17 PM

## 2017-05-12 NOTE — DISCHARGE INSTRUCTIONS
Same Day Surgery Discharge Instructions for  Sedation and General Anesthesia       It's not unusual to feel dizzy, light-headed or faint for up to 24 hours after surgery or while taking pain medication.  If you have these symptoms: sit for a few minutes before standing and have someone assist you when you get up to walk or use the bathroom.      You should rest and relax for the next 24 hours. We recommend you make arrangements to have an adult stay with you for at least 24 hours after your discharge.  Avoid hazardous and strenuous activity.      DO NOT DRIVE any vehicle or operate mechanical equipment for 24 hours following the end of your surgery.  Even though you may feel normal, your reactions may be affected by the medication you have received.      Do not drink alcoholic beverages for 24 hours following surgery.       Slowly progress to your regular diet as you feel able. It's not unusual to feel nauseated and/or vomit after receiving anesthesia.  If you develop these symptoms, drink clear liquids (apple juice, ginger ale, broth, 7-up, etc. ) until you feel better.  If your nausea and vomiting persists for 24 hours, please notify your surgeon.        All narcotic pain medications, along with inactivity and anesthesia, can cause constipation. Drinking plenty of liquids and increasing fiber intake will help.      For any questions of a medical nature, call your surgeon.      Do not make important decisions for 24 hours.      If you had general anesthesia, you may have a sore throat for a couple of days related to the breathing tube used during surgery.  You may use Cepacol lozenges to help with this discomfort.  If it worsens or if you develop a fever, contact your surgeon.       If you feel your pain is not well managed with the pain medications prescribed by your surgeon, please contact your surgeon's office to let them know so they can address your concerns.       DISCHARGE INSTRUCTIONS FOR   CATHETER CARE AT  HOME      .      Basic Catheter Care  1. Always wash hands before and after handling your catheter.  2. Use soap and water to wash the area around your catheter.  3. Do this procedure twice a day.  4. Proper cleansing will help keep the area from becoming irritated or infected.    Leg Bag  This is a small plastic bag that collects urine draining from your catheter and then strapped around your thigh. It will need to be emptied when the bag is 1/2 to 3/4 full.     Large Drainage Bag  1. This bag is larger than the leg bag and holds more urine.  It is to be used while at home, especially at night.    2. Before you go to bed, change the leg bag to the large drainage bag.  3. Pinch off the catheter with your fingers and swab the connection between the catheter and leg bag with alcohol sponge.  4. Disconnect the leg bag and connect the large drainage bag to your catheter.  5. When you get into bed, arrange the drainage tubing so that it doesn t kink.  6. Be sure to keep the bag below the level of your bladder and allow enough slack for turning.    Cleaning Your Drainage Bags    1. Wash hands.  2. Using funnel or syringe, fill the bag half full with a solution of 1/2  vinegar and 1/2 water.  3. Shake bag, allowing mixture to cleanse inside of bag.  4. Empty out all vinegar and water mixture from your bag.  5. Hang bag to dry when not in use.  6. Clean your bags anytime you change them.      Helpful Hints  1. Always keep drainage bags below bladder level to insure adequate drainage.  2. Drink 4-6 glasses of water daily along with other fluids you normally drink to keep urine free of infection and / or clots.  3. If you notice no urine in your bag for 2 to 4 hours or you develop extreme discomfort in bladder area, your catheter maybe plugged.  Notify your doctor.  4. If you notice your urine becomes foul smelling and cloudy, notify your doctor.  Also notify your doctor if you develop fever or chills.  5. If you notice urine  leaking around the outside of the catheter, check to be sure catheter or tubing is not kinked.  6. Don t use leg bag while in bed.          HOW TO REMOVE YOUR CATHETER INSTRUCTIONS    1. Wash your hands.    2. Insert the syringe into balloon port of the catheter.    3. Withdraw all the fluid from the balloon.  You may have to re-insert the syringe into the port additional times until no more fluid can be withdrawn.    4. Pull gently on the catheter.  If no resistance, slowly withdraw.    5. Dispose of the catheter and the syringe.    6. Wash your hands.    7. Call your doctor if unable to urinate within 6-8 hours, or when uncomfortable.     Reasons to contact your surgeon:    1. Signs of possible infection: Check your incision daily for redness, swelling, warmth, red streaks or foul drainage.   2. Elevated temperature.  3. Pain not controlled with pain medication and/or rest.   4. Uncontrolled nausea or vomiting.  5. Any questions or concerns.        Discharge Instructions Following Anorectal Surgery  [facility]  [phone]  Medication:     You may be prescribed a narcotic pain relievers such as: Vicodin, Percocet, and Tylenol # 3.  You should reduce your use of these medications as your pain improves.  You may be prescribed an anal ointment (such as Americain, Tronothane, or Xylocaine). Apply the ointment to the anal area with your finger, then cover the area with cotton or gauze.  Bulk forming stool softeners (Konsyl-DR, Metamucil, or Citrucel) are to be taken in a glass of water once in the morning with increased water intake throughout the day.   Bowel function:   Bowel movements can be painful. It is important to have regular bowel movements at least every other day and to keep your stool soft. Constipation and/or diarrhea can make the pain worse and must be avoided.   Constipation:  You should have a bowel movement at least every other day.  If two days pass without one, take one  tablespoon of milk of magnesia; if there is no result, repeat this dose in six hours. You may also use an over-the-counter phosphate enema or tap water enema.   Diarrhea:  Diarrhea, usually caused by the overuse of laxatives, is also a concern. If you have more than three watery bowel movements during a 24 hour period, stop taking milk of magnesia or laxatives but continue taking the bulk forming agents.  If diarrhea persists, call your doctor.   Bathing:  After bowel movements, use a wet washcloth, toilet paper or cotton to clean yourself.  If possible take a sitz bath or tub bath immediately. Baths should last between 10-15 minutes with the water as warm as you can comfortably tolerate. Try to take at least three sitz baths (or showers with hand-held sprayer) every day.   Discharge/Infection:  Bloody discharge after bowel movements is normal and may last 2 to 4 weeks after your surgery. However, if you have bleeding between bowel movements that doesn't stop, call the office.  Urination:  If you have trouble urinating, do so while sitting in a tub of water, or run the water faucet while sitting on the toilet. If you are unable to urinate 6-8 hours after surgery, call the office.  Diet:  A high fiber diet, including at least four servings of fruits or vegetables daily, will help to keep your bowel movements regular and soft. It is important to drink six to eight glasses of water or juice everyday when using fiber products.   Activity:    Activity as tolerated. After some surgeries, you may be told not to perform any lifting (more than 10 pounds) for several weeks after surgery.    Call the office if you have:  Fever greater than 101   Chills   Foul-smelling drainage   Nausea and vomiting   Diarrhea - greater than 3 water stools in 24 hours   Constipation - no bowel movement for 3 days   Severe bleeding that does not stop soon after a bowel movement   Problems with the incision, including increased pain, swelling,  redness, or drainage.  Difficulty urinating    **If you have questions or concerns about your procedure, call   Dr. Martinez at [phone]**

## 2017-05-13 LAB
BACTERIA SPEC CULT: NO GROWTH
Lab: NORMAL
MICRO REPORT STATUS: NORMAL
SPECIMEN SOURCE: NORMAL

## 2017-05-13 NOTE — OP NOTE
DATE OF PROCEDURE:  05/12/2017      PREOPERATIVE DIAGNOSIS:  Anal pain and fever, status post excisional hemorrhoidectomy.      POSTOPERATIVE DIAGNOSIS:  Anal pain and fever, status post excisional hemorrhoidectomy.      PROCEDURE:  Exam under anesthesia.      SURGEON:  Gage Martinez MD      ANESTHESIA:  General.      INDICATIONS:  Aman Tarango is a 57-year-old male who underwent internal rubber band ligation of a large left lateral internal hemorrhoid approximately 3 weeks ago.  He had a significant amount of pain after the banding and was brought to the operating room 6 days ago for an exam under anesthesia.  The banding site looked good, but he has large bleeding hemorrhoids in the right anterior and right posterior positions.  Due to his history of anemia and continued bleeding he underwent an excisional hemorrhoidectomy.  He had difficulty with urinary retention requiring a straight cath in the emergency room 3 days ago.  Ever since that time he has had continued difficulty with urination as well as dysuria.  He has also had continued severe rectal pain along with subjective chills and a fever to 101.  It was thought that he could have a urinary tract infection, but he will now undergo exam under anesthesia to rule out any other cause for his continued severe pain other than normal postoperative pain following the hemorrhoidectomy.      OPERATIVE FINDINGS:  The Thomas catheter insertion in the operating room was performed without any difficulty.  His urine appeared somewhat dark and concentrated.  The hemorrhoidectomy site for the right posterior and right anterior positions appear completely normal and were partially open with some of the Vicryl sutures still present but loose.  There was some friability at the excision site, especially at the pedicle of the right anterior excision site which was reinforced with a Vicryl suture.  There was no evidence of any infection in the anal canal and the prior  banding site in the left lateral position revealed only a small scar.  Therefore, there were no significant intraoperative findings to explain his fever and/or chills, other than a possible urinary tract infection.      PROCEDURE IN DETAIL:  After informed consent was obtained, the patient was brought to the operating room where general anesthesia was induced without difficulty after placement of sequential compression devices on the lower extremities.  A Thomas catheter was inserted without any difficulty revealing fairly concentrated urine.  The specimen was sent sterilely for urinalysis and culture since he was unable to void preoperatively.  He was flipped into the well-padded prone jackknife position with his buttocks taped apart.  His perianal region was sterilely prepped and draped in the usual fashion.      A medium Hill-Erickson retractor was inserted into the anal canal, and the operative findings are noted above.  Both excision sites in the posterior right anterior positions carefully examined and were found to be completely normal with normal healing.  The excision sites were partially open with loose Vicryl sutures which were excised.  There was some friability at the base of the right anterior excision site and the pedicle was reinforced with a figure-of-eight 2-0 Vicryl suture.  The left lateral banding site several weeks ago was carefully examined and revealed only a small scar without any significant evidence of infection or necrosis.  There is no evidence of abscess or infection in the anal canal.      An anal field block consisting of 40 mL of 0.5% lidocaine with epinephrine and 0.25% Marcaine was instilled into the four quadrants of the anal canal for postoperative analgesia.  Adán potato starch powder was inserted into the anal canal in the area of the excision site in the right posterior and left posterior positions.  A sterile gauze dressing was applied.      The patient tolerated the  procedure well without complications.  Estimated blood loss was 3 mL.  I was present and scrubbed for the entire duration of the operation.  The patient was returned to the supine position and extubated in the operating room.  He was brought to the recovery room in stable condition.         AURELIA PAGE MD             D: 2017 13:21   T: 2017 13:47   MT: [id number]      Name:     CHRISTINE LAW   MRN:      [id number]        Account:        [id number]   :      1960           Procedure Date: 2017      Document: [id number]

## 2022-07-08 NOTE — PROGRESS NOTES
[facility]  PROGRESS NOTE    May 7, 2017  Post-op Day # 1 excisional hemorrhoidectomy    SUBJECTIVE:  Still having anal pain, no bleeding.  Feeling like he cannot void urine and this is uncomfortable.  Unable to sleep last night.     OBJECTIVE:  Temp:  [97.4  F (36.3  C)-99.4  F (37.4  C)] 97.4  F (36.3  C)  Pulse:  [] 97  Heart Rate:  [] 103  Resp:  [11-22] 16  BP: ()/(70-94) 119/70  SpO2:  [95 %-100 %] 97 %    Intake/Output Summary (Last 24 hours) at 05/07/17 1024  Last data filed at 05/07/17 0700   Gross per 24 hour   Intake             1440 ml   Output               50 ml   Net             1390 ml       GENERAL:  Awake, alert, no acute distress      LABS:  Lab Results   Component Value Date    WBC 7.1 05/06/2017     Lab Results   Component Value Date    HGB 9.9 05/06/2017     Lab Results   Component Value Date    HCT 32.9 05/06/2017     Lab Results   Component Value Date     05/06/2017       ASSESSMENT/PLAN: POD#1 excisional hemorrhoidectomy  1. Regular diet with plenty of fluids  2. Oral percocet and given script for valium as well when at home  3. Multiple warm baths daily  4. Ibuprofen TID at home  5. If having urinary retention then place mckeon to leg bag and teach patient how to remove on Tuesday.  6. D/c to home    Fannie Ball MD  [facility]  Pager:  [phone]  Phone: [phone]  Fax: [phone]         94

## (undated) DEVICE — DRAPE PEDS  LAP 29493

## (undated) DEVICE — HEMOSTAT ABSORBABLE POWDER ARISTA 1GM SM0005-USA

## (undated) DEVICE — SYR 10ML SLIP TIP W/O NDL

## (undated) DEVICE — PACK MINOR SBA15MIFSE

## (undated) DEVICE — SUCTION TIP YANKAUER W/O VENT K86

## (undated) DEVICE — GLOVE PROTEXIS W/NEU-THERA 7.5  2D73TE75

## (undated) DEVICE — SUCTION CANISTER MEDIVAC LINER 3000ML W/LID 65651-530

## (undated) DEVICE — GLOVE PROTEXIS W/NEU-THERA 6.0  2D73TE60

## (undated) DEVICE — NDL 22GA 1.5"

## (undated) DEVICE — DRSG GAUZE 4X4" 3033

## (undated) DEVICE — SU CHROMIC 2-0 UR-6 27" N878H

## (undated) DEVICE — SOL BENZOIN 0.5OZ

## (undated) DEVICE — GLOVE PROTEXIS BLUE W/NEU-THERA 8.0  2D73EB80

## (undated) DEVICE — SYR 10ML FINGER CONTROL W/O NDL 309695

## (undated) DEVICE — SYR EAR BULB 3OZ 0035830

## (undated) DEVICE — NDL SPINAL 22GA 3.5" QUINCKE 405181

## (undated) DEVICE — LINEN TOWEL PACK X5 5464

## (undated) DEVICE — SOL NACL 0.9% IRRIG 1000ML BOTTLE 07138-09

## (undated) DEVICE — GOWN IMPERVIOUS BREATHABLE SMART LG 89015

## (undated) DEVICE — SU VICRYL 3-0 SH 27" J316H

## (undated) DEVICE — SU VICRYL 2-0 UR-6 27" J602H

## (undated) DEVICE — GLOVE PROTEXIS BLUE W/NEU-THERA 6.5  2D73EB65

## (undated) DEVICE — SPONGE BALL KERLIX ROUND XL W/O STRING LATEX 4935

## (undated) DEVICE — SPONGE RAY-TEC 3X3" 30-094

## (undated) DEVICE — DECANTER VIAL 2006S

## (undated) RX ORDER — FENTANYL CITRATE 50 UG/ML
INJECTION, SOLUTION INTRAMUSCULAR; INTRAVENOUS
Status: DISPENSED
Start: 2017-05-06

## (undated) RX ORDER — ONDANSETRON 2 MG/ML
INJECTION INTRAMUSCULAR; INTRAVENOUS
Status: DISPENSED
Start: 2017-05-12

## (undated) RX ORDER — CIPROFLOXACIN 2 MG/ML
INJECTION, SOLUTION INTRAVENOUS
Status: DISPENSED
Start: 2017-05-12

## (undated) RX ORDER — LIDOCAINE HYDROCHLORIDE 20 MG/ML
INJECTION, SOLUTION EPIDURAL; INFILTRATION; INTRACAUDAL; PERINEURAL
Status: DISPENSED
Start: 2017-05-12

## (undated) RX ORDER — FENTANYL CITRATE 50 UG/ML
INJECTION, SOLUTION INTRAMUSCULAR; INTRAVENOUS
Status: DISPENSED
Start: 2017-05-12

## (undated) RX ORDER — NEOSTIGMINE METHYLSULFATE 1 MG/ML
VIAL (ML) INJECTION
Status: DISPENSED
Start: 2017-05-06

## (undated) RX ORDER — PROPOFOL 10 MG/ML
INJECTION, EMULSION INTRAVENOUS
Status: DISPENSED
Start: 2017-05-12

## (undated) RX ORDER — ACETAMINOPHEN 10 MG/ML
INJECTION, SOLUTION INTRAVENOUS
Status: DISPENSED
Start: 2017-05-06

## (undated) RX ORDER — DEXAMETHASONE SODIUM PHOSPHATE 4 MG/ML
INJECTION, SOLUTION INTRA-ARTICULAR; INTRALESIONAL; INTRAMUSCULAR; INTRAVENOUS; SOFT TISSUE
Status: DISPENSED
Start: 2017-05-12

## (undated) RX ORDER — ONDANSETRON 2 MG/ML
INJECTION INTRAMUSCULAR; INTRAVENOUS
Status: DISPENSED
Start: 2017-05-06

## (undated) RX ORDER — KETOROLAC TROMETHAMINE 30 MG/ML
INJECTION, SOLUTION INTRAMUSCULAR; INTRAVENOUS
Status: DISPENSED
Start: 2017-05-06

## (undated) RX ORDER — LIDOCAINE HYDROCHLORIDE 20 MG/ML
INJECTION, SOLUTION EPIDURAL; INFILTRATION; INTRACAUDAL; PERINEURAL
Status: DISPENSED
Start: 2017-05-06

## (undated) RX ORDER — PROPOFOL 10 MG/ML
INJECTION, EMULSION INTRAVENOUS
Status: DISPENSED
Start: 2017-05-06

## (undated) RX ORDER — LIDOCAINE HYDROCHLORIDE AND EPINEPHRINE 10; 10 MG/ML; UG/ML
INJECTION, SOLUTION INFILTRATION; PERINEURAL
Status: DISPENSED
Start: 2017-05-06

## (undated) RX ORDER — GLYCOPYRROLATE 0.2 MG/ML
INJECTION, SOLUTION INTRAMUSCULAR; INTRAVENOUS
Status: DISPENSED
Start: 2017-05-06

## (undated) RX ORDER — DEXAMETHASONE SODIUM PHOSPHATE 4 MG/ML
INJECTION, SOLUTION INTRA-ARTICULAR; INTRALESIONAL; INTRAMUSCULAR; INTRAVENOUS; SOFT TISSUE
Status: DISPENSED
Start: 2017-05-06

## (undated) RX ORDER — HYDROMORPHONE HYDROCHLORIDE 1 MG/ML
INJECTION, SOLUTION INTRAMUSCULAR; INTRAVENOUS; SUBCUTANEOUS
Status: DISPENSED
Start: 2017-05-06

## (undated) RX ORDER — MEPERIDINE HYDROCHLORIDE 25 MG/ML
INJECTION INTRAMUSCULAR; INTRAVENOUS; SUBCUTANEOUS
Status: DISPENSED
Start: 2017-05-06

## (undated) RX ORDER — BUPIVACAINE HYDROCHLORIDE 5 MG/ML
INJECTION, SOLUTION EPIDURAL; INTRACAUDAL
Status: DISPENSED
Start: 2017-05-06